# Patient Record
Sex: FEMALE | Race: BLACK OR AFRICAN AMERICAN | Employment: PART TIME | URBAN - METROPOLITAN AREA
[De-identification: names, ages, dates, MRNs, and addresses within clinical notes are randomized per-mention and may not be internally consistent; named-entity substitution may affect disease eponyms.]

---

## 2021-12-22 ENCOUNTER — VIRTUAL VISIT (OUTPATIENT)
Dept: FAMILY MEDICINE CLINIC | Age: 31
End: 2021-12-22
Payer: MEDICAID

## 2021-12-22 DIAGNOSIS — Z00.00 ENCOUNTER FOR WELLNESS EXAMINATION IN ADULT: ICD-10-CM

## 2021-12-22 DIAGNOSIS — F41.9 ANXIETY AND DEPRESSION: Primary | ICD-10-CM

## 2021-12-22 DIAGNOSIS — R63.0 DECREASE IN APPETITE: ICD-10-CM

## 2021-12-22 DIAGNOSIS — F32.A ANXIETY AND DEPRESSION: Primary | ICD-10-CM

## 2021-12-22 DIAGNOSIS — F51.01 PRIMARY INSOMNIA: ICD-10-CM

## 2021-12-22 PROCEDURE — 99203 OFFICE O/P NEW LOW 30 MIN: CPT | Performed by: NURSE PRACTITIONER

## 2021-12-22 PROCEDURE — 99385 PREV VISIT NEW AGE 18-39: CPT | Performed by: NURSE PRACTITIONER

## 2021-12-22 RX ORDER — CHOLECALCIFEROL (VITAMIN D3) 125 MCG
CAPSULE ORAL
COMMUNITY
End: 2022-03-28 | Stop reason: ALTCHOICE

## 2021-12-22 RX ORDER — UREA 10 %
100 LOTION (ML) TOPICAL DAILY
COMMUNITY
End: 2022-03-28 | Stop reason: ALTCHOICE

## 2021-12-22 RX ORDER — SERTRALINE HYDROCHLORIDE 25 MG/1
25 TABLET, FILM COATED ORAL DAILY
Qty: 90 TABLET | Refills: 1 | Status: SHIPPED | OUTPATIENT
Start: 2021-12-22 | End: 2022-01-05 | Stop reason: SDUPTHER

## 2021-12-22 RX ORDER — ESZOPICLONE 2 MG/1
2 TABLET, FILM COATED ORAL
Qty: 90 TABLET | Refills: 0 | Status: SHIPPED | OUTPATIENT
Start: 2021-12-22 | End: 2022-01-21 | Stop reason: ALTCHOICE

## 2021-12-22 NOTE — PROGRESS NOTES
1. Have you been to the ER, urgent care clinic since your last visit? Hospitalized since your last visit? No    2. Have you seen or consulted any other health care providers outside of the [address] [place] [address] since your last visit? Include any pap smears or colon screening. 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 Pt sees a therapist but pt stated she had a mental breakdown last month and she needs something to help. possibly zoloft? .  POt is ashamed of what she is going through she does not feel in control    Depression   '  3 most recent PHQ Screens 12/22/2021   Little interest or pleasure in doing things Nearly every day   Feeling down, depressed, irritable, or hopeless Nearly every day   Total Score PHQ 2 6   Trouble falling or staying asleep, or sleeping too much More than half the days   Feeling tired or having little energy Nearly every day   Poor appetite, weight loss, or overeating Nearly every day   Feeling bad about yourself - or that you are a failure or have let yourself or your family down Nearly every day   Trouble concentrating on things such as school, work, reading, or watching TV Nearly every day   Moving or speaking so slowly that other people could have noticed; or the opposite being so fidgety that others notice Nearly every day   Thoughts of being better off dead, or hurting yourself in some way More than half the days   PHQ 9 Score 25   How difficult have these problems made it for you to do your work, take care of your home and get along with others Very difficult     ROSALVA 2/7 12/22/2021   Feeling nervous, anxious, or on edge 3   Not being able to stop or control worrying 3   Worrying too much about different things 3   Trouble relaxing 3   Being so restless that it is hard to sit still 3   Becoming easily annoyed or irritable 3   Feeling afraid as if something awful might happen 3   ROSALVA-7 Total Score 21
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
## 2022-01-05 NOTE — TELEPHONE ENCOUNTER
Pt called in she said she was only able to get a 30 day supply of her Zoloft , and she said she found that 50mg each night works best for her , as of right now she only has 3 days left of the medication , she said she wont have insurance for another 3 months but she said if she can get refills she will figure out the cost for right now as she does not want to stop medication.  I recommended she check on [facility] for discount cards to help with cost .

## 2022-01-06 RX ORDER — SERTRALINE HYDROCHLORIDE 25 MG/1
25 TABLET, FILM COATED ORAL DAILY
Qty: 90 TABLET | Refills: 1 | Status: SHIPPED | OUTPATIENT
Start: 2022-01-06 | End: 2022-01-07 | Stop reason: SDUPTHER

## 2022-01-07 RX ORDER — SERTRALINE HYDROCHLORIDE 25 MG/1
25 TABLET, FILM COATED ORAL DAILY
Qty: 30 TABLET | Refills: 2 | Status: SHIPPED | OUTPATIENT
Start: 2022-01-07 | End: 2022-01-21 | Stop reason: ALTCHOICE

## 2022-01-07 NOTE — TELEPHONE ENCOUNTER
Patient called stating that [facility] will not fill for 90-day supply. Please review and adjust.    Please call patient once completed.

## 2022-01-21 ENCOUNTER — OFFICE VISIT (OUTPATIENT)
Dept: FAMILY MEDICINE CLINIC | Age: 32
End: 2022-01-21
Payer: COMMERCIAL

## 2022-01-21 DIAGNOSIS — F32.A ANXIETY AND DEPRESSION: ICD-10-CM

## 2022-01-21 DIAGNOSIS — F51.01 PRIMARY INSOMNIA: ICD-10-CM

## 2022-01-21 DIAGNOSIS — E66.3 OVERWEIGHT: Primary | ICD-10-CM

## 2022-01-21 DIAGNOSIS — Z11.59 ENCOUNTER FOR HEPATITIS C SCREENING TEST FOR LOW RISK PATIENT: ICD-10-CM

## 2022-01-21 DIAGNOSIS — Z13.0 SCREENING FOR DEFICIENCY ANEMIA: ICD-10-CM

## 2022-01-21 DIAGNOSIS — R63.0 DECREASE IN APPETITE: ICD-10-CM

## 2022-01-21 DIAGNOSIS — Z13.220 SCREENING CHOLESTEROL LEVEL: ICD-10-CM

## 2022-01-21 DIAGNOSIS — F41.9 ANXIETY AND DEPRESSION: ICD-10-CM

## 2022-01-21 DIAGNOSIS — E55.9 VITAMIN D DEFICIENCY: ICD-10-CM

## 2022-01-21 DIAGNOSIS — E53.8 VITAMIN B12 DEFICIENCY: ICD-10-CM

## 2022-01-21 PROBLEM — Z00.00 ENCOUNTER FOR WELLNESS EXAMINATION IN ADULT: Status: RESOLVED | Noted: 2021-12-22 | Resolved: 2022-01-21

## 2022-01-21 PROCEDURE — 99214 OFFICE O/P EST MOD 30 MIN: CPT | Performed by: NURSE PRACTITIONER

## 2022-01-21 RX ORDER — SERTRALINE HYDROCHLORIDE 100 MG/1
100 TABLET, FILM COATED ORAL DAILY
Qty: 90 TABLET | Refills: 1 | Status: SHIPPED | OUTPATIENT
Start: 2022-01-21 | End: 2022-07-22

## 2022-01-21 RX ORDER — ESZOPICLONE 2 MG/1
TABLET, FILM COATED ORAL
Qty: 180 TABLET | Refills: 1 | Status: SHIPPED | OUTPATIENT
Start: 2022-01-21 | End: 2022-08-03 | Stop reason: ALTCHOICE

## 2022-01-21 NOTE — PROGRESS NOTES
Russel Miller (: 1990) is a 33 y.o. female, established patient, here for evaluation of the following chief complaint(s):   Follow Up Chronic Condition       ASSESSMENT/PLAN:  Below is the assessment and plan developed based on review of pertinent history, labs, studies, and medications. 1. Overweight  -     CBC WITH AUTOMATED DIFF; Future  -     METABOLIC PANEL, COMPREHENSIVE; Future  Obtaining updated CBC and CMP for trending and will make treatment decisions when I get the results  2. Screening cholesterol level  -     LIPID PANEL; Future  Obtaining updated lipid panel for trending and will make treatment decisions when I get the results  3. Screening for deficiency anemia  Obtaining updated CBC for trending and will make supplementation decisions when I get the results  4. Vitamin D deficiency  -     VITAMIN D, 25 HYDROXY; Future  Obtaining updated Vitamin D for trending and will make treatment decisions when I get the results  5. Vitamin B12 deficiency  -     VITAMIN B12; Future  Obtaining updated B12 for trending and will make supplementation decisions when I get the results  6. Encounter for hepatitis C screening test for low risk patient  -     HEPATITIS C AB; Future  Screening for hep C and will confirm positive with additional lab testing and will also refer to GI for further evaluation and treatment  7. Primary insomnia  -     eszopiclone (LUNESTA) 2 mg tablet; Take 2 tablets at bedtime by mouth, Normal, Disp-180 Tablet, R-1  Patient states that this med works very well for her insomnia. Will continue with this regimen at its present dosage  8. Anxiety and depression  9. Decrease in appetite  Patient has noted and increase in her appetite with the treatment of her anxiety and depression. Will continue with her zoloft and will monitor appetite with subsequent office visits      Return in about 6 months (around 2022) for f/u of anxiety and depression.     SUBJECTIVE/OBJECTIVE:  34 yo female presents after being put on zoloft for depression and anxiety. Patient is very pleased with the use of this medication and wishes that she had done it sooner. It has helped with her self confidence and in other ways that she could not have possibly imagined. She has also agreed to come in for fasting labs. She is also finding that lunesta has improved her ability to sleep. She is in need of refills of the lunesta and the zoloft      Review of Systems   ROS per HPI and PMH  No data recorded     Physical Exam  HENT:      Head: Normocephalic. Neurological:      Mental Status: She is alert and oriented to person, place, and time. Psychiatric:         Mood and Affect: Mood normal.         Behavior: Behavior normal.         Thought Content: Thought content normal.         Judgment: Judgment normal.                 Serg Burgos, was evaluated through a synchronous (real-time) audio-video encounter. The patient (or guardian if applicable) is aware that this is a billable service, which includes applicable co-pays. Verbal consent to proceed has been obtained. The visit was conducted pursuant to the emergency declaration under the [facility], [address] [place] [address] [facility] Health authority and the [employer] General Act. Patient identification was verified, and a caregiver was present when appropriate. The patient was located at home in a state where the provider was licensed to provide care. An electronic signature was used to authenticate this note.   -- Lisa Arellano NP

## 2022-01-21 NOTE — PROGRESS NOTES
Chief Complaint   Patient presents with    Follow Up Chronic Condition   1. Have you been to the ER, urgent care clinic since your last visit? Hospitalized since your last visit? No    2. Have you seen or consulted any other health care providers outside of the [address] [place] [address] since your last visit? Include any pap smears or colon screening.  No       Patient would like to use # [phone] for her visit

## 2022-01-23 PROBLEM — E55.9 VITAMIN D DEFICIENCY: Status: ACTIVE | Noted: 2022-01-23

## 2022-01-23 PROBLEM — E53.8 VITAMIN B12 DEFICIENCY: Status: ACTIVE | Noted: 2022-01-23

## 2022-01-23 PROBLEM — Z11.59 ENCOUNTER FOR HEPATITIS C SCREENING TEST FOR LOW RISK PATIENT: Status: ACTIVE | Noted: 2021-12-22

## 2022-01-23 PROBLEM — Z13.0 SCREENING FOR DEFICIENCY ANEMIA: Status: ACTIVE | Noted: 2021-12-22

## 2022-01-23 PROBLEM — Z13.220 SCREENING CHOLESTEROL LEVEL: Status: ACTIVE | Noted: 2021-12-22

## 2022-03-02 ENCOUNTER — TELEPHONE (OUTPATIENT)
Dept: FAMILY MEDICINE CLINIC | Age: 32
End: 2022-03-02

## 2022-03-02 NOTE — TELEPHONE ENCOUNTER
You can schedule for a same day tomorrow or see if [facility] has any available appts next week

## 2022-03-02 NOTE — TELEPHONE ENCOUNTER
----- Message from Vickyia Nehemiah sent at 3/2/2022  8:25 AM EST -----  Subject: Appointment Request    Reason for Call: Routine (Patient Request) No Script    QUESTIONS  Type of Appointment? Established Patient  Reason for appointment request? Available appointments did not meet   patient need  Additional Information for Provider? Patient needs to be seen for nodule   in middle of breasts. Would like to be seen in person. Please call patient   back to get appt. Only virtual appts are showing. [id number]  ---------------------------------------------------------------------------  --------------  Angus KING  What is the best way for the office to contact you? OK to leave message on   voicemail  Preferred Call Back Phone Number? [phone]  ---------------------------------------------------------------------------  --------------  SCRIPT ANSWERS  Relationship to Patient? Self  (Is the patient requesting to see the provider for a procedure?)? No  (Is the patient requesting to see the provider urgently  today or   tomorrow. )? No  Have you been diagnosed with, awaiting test results for, or told that you   are suspected of having COVID-19 (Coronavirus)? (If patient has tested   negative or was tested as a requirement for work, school, or travel and   not based on symptoms, answer no)? No  Within the past 10 days have you developed any of the following symptoms   (answer no if symptoms have been present longer than 10 days or began   more than 10 days ago)? Fever or Chills, Cough, Shortness of breath or   difficulty breathing, Loss of taste or smell, Sore throat, Nasal   congestion, Sneezing or runny nose, Fatigue or generalized body aches   (answer no if pain is specific to a body part e.g. back pain), Diarrhea,   Headache? No  Have you had close contact with someone with COVID-19 in the last 7 days? No  (Service Expert  click yes below to proceed with [facility] As Usual   Scheduling)?  Yes

## 2022-03-07 ENCOUNTER — OFFICE VISIT (OUTPATIENT)
Dept: FAMILY MEDICINE CLINIC | Age: 32
End: 2022-03-07
Payer: COMMERCIAL

## 2022-03-07 VITALS
TEMPERATURE: 97.2 F | WEIGHT: 170 LBS | BODY MASS INDEX: 31.28 KG/M2 | HEIGHT: 62 IN | OXYGEN SATURATION: 99 % | RESPIRATION RATE: 16 BRPM | SYSTOLIC BLOOD PRESSURE: 138 MMHG | HEART RATE: 92 BPM | DIASTOLIC BLOOD PRESSURE: 88 MMHG

## 2022-03-07 DIAGNOSIS — Z13.0 SCREENING FOR DEFICIENCY ANEMIA: ICD-10-CM

## 2022-03-07 DIAGNOSIS — F41.9 ANXIETY AND DEPRESSION: ICD-10-CM

## 2022-03-07 DIAGNOSIS — K44.9 HIATAL HERNIA: Primary | ICD-10-CM

## 2022-03-07 DIAGNOSIS — Z13.220 SCREENING CHOLESTEROL LEVEL: ICD-10-CM

## 2022-03-07 DIAGNOSIS — E66.3 OVERWEIGHT: ICD-10-CM

## 2022-03-07 DIAGNOSIS — E55.9 VITAMIN D DEFICIENCY: ICD-10-CM

## 2022-03-07 DIAGNOSIS — F51.01 PRIMARY INSOMNIA: ICD-10-CM

## 2022-03-07 DIAGNOSIS — Z11.59 ENCOUNTER FOR HEPATITIS C SCREENING TEST FOR LOW RISK PATIENT: ICD-10-CM

## 2022-03-07 DIAGNOSIS — E53.8 VITAMIN B12 DEFICIENCY: ICD-10-CM

## 2022-03-07 DIAGNOSIS — F32.A ANXIETY AND DEPRESSION: ICD-10-CM

## 2022-03-07 PROCEDURE — 99214 OFFICE O/P EST MOD 30 MIN: CPT | Performed by: NURSE PRACTITIONER

## 2022-03-07 NOTE — PROGRESS NOTES
Chief Complaint   Patient presents with    Other     Bulding btwn chest, it was sore, mild discoloration      1. Have you been to the ER, urgent care clinic since your last visit? Hospitalized since your last visit? No    2. Have you seen or consulted any other health care providers outside of the [address] [place] [address] since your last visit? Include any pap smears or colon screening.  No   Visit Vitals  BP (!) 140/90 (BP 1 Location: Left upper arm, BP Patient Position: Sitting, BP Cuff Size: Adult)   Pulse 92   Temp 97.2 °F (36.2 °C) (Temporal)   Resp 16   Ht 5' 2\" (1.575 m)   Wt 170 lb (77.1 kg)   LMP 02/24/2022   SpO2 99%   BMI 31.09 kg/m²

## 2022-03-07 NOTE — PROGRESS NOTES
Subjective  Chief Complaint   Patient presents with    Other     Bulding btwn chest, it was sore, mild discoloration      HPI:  Maria L Abdullahi is a 33 y.o. female. 34 yo female presents with a complaint of an out pouching between her breast that is also hyperpigmented for the last 2 months. It was sore but now is not. She has noticed anything that had made it better or worse. Patient also did not get in to get her labs drawn so she will be doing so today. I also am going to have my CMA follow up on her referral to [address] [place] [address]. 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    Past Medical History:   Diagnosis Date    Anxiety     Depression      Family History   Problem Relation Age of Onset    Other Mother         pancreticitis    Hypertension Father      Social History     Socioeconomic History    Marital status: SINGLE     Spouse name: Not on file    Number of children: Not on file    Years of education: Not on file    Highest education level: Not on file   Occupational History    Not on file   Tobacco Use    Smoking status: Never Smoker    Smokeless tobacco: Never Used   Vaping Use    Vaping Use: Never used   Substance and Sexual Activity    Alcohol use: Never    Drug use: Never    Sexual activity: Not Currently     Birth control/protection: None   Other Topics Concern    Not on file   Social History Narrative    Not on file     Social Determinants of Health     Financial Resource Strain:     Difficulty of Paying Living Expenses: Not on file   Food Insecurity:     Worried About [address] in the Last Year: Not on file    [address] in the Last Year: Not on file   Transportation Needs:     Lack of Transportation (Medical): Not on file    Lack of Transportation (Non-Medical):  Not on file   Physical Activity:     Days of Exercise per Week: Not on file    Minutes of Exercise per Session: Not on file   Stress:     Feeling of Stress : Not on file   Social Connections:     Frequency of Communication with Friends and Family: Not on file    Frequency of Social Gatherings with Friends and Family: Not on file    Attends Voodoo Services: Not on file    Active Member of [address] [place] [address] [employer] or Organizations: Not on file    Attends Club or Organization Meetings: Not on file    Marital Status: Not on file   Intimate Partner Violence:     Fear of Current or Ex-Partner: Not on file    Emotionally Abused: Not on file    Physically Abused: Not on file    Sexually Abused: Not on file   Housing Stability:     Unable to Pay for Housing in the Last Year: Not on file    Number of [place] in the Last Year: Not on file    Unstable Housing in the Last Year: Not on file     Current Outpatient Medications on File Prior to Visit   Medication Sig Dispense Refill    sertraline (ZOLOFT) 100 mg tablet Take 1 Tablet by mouth daily. 90 Tablet 1    eszopiclone (LUNESTA) 2 mg tablet Take 2 tablets at bedtime by mouth 180 Tablet 1    cholecalciferol, vitamin D3, (Vitamin D3) 50 mcg (2,000 unit) tab Take  by mouth. (Patient not taking: Reported on 3/7/2022)      cyanocobalamin (Vitamin B-12) 100 mcg tablet Take 100 mcg by mouth daily. (Patient not taking: Reported on 3/7/2022)       No current facility-administered medications on file prior to visit. No Known Allergies  ROS   ROS per HPI and PMH      Objective  Physical Exam  Vitals reviewed. HENT:      Head: Normocephalic. Cardiovascular:      Rate and Rhythm: Normal rate and regular rhythm. Heart sounds: Normal heart sounds. Pulmonary:      Effort: Pulmonary effort is normal.      Breath sounds: Normal breath sounds. Abdominal:      General: Bowel sounds are normal.      Palpations: Abdomen is soft. Skin:     Comments: Soft reducible mass mid breasts.   The skin is also hyperpigmented   Neurological:      Mental Status: She is alert and oriented to person, place, and time. Psychiatric:         Mood and Affect: Mood normal.         Behavior: Behavior normal.         Thought Content: Thought content normal.         Judgment: Judgment normal.          Assessment & Plan      ICD-10-CM ICD-9-CM    1. Hiatal hernia  K44.9 553.3 US CHEST      US CHEST   2. Vitamin D deficiency  E55.9 268.9 VITAMIN D, 25 HYDROXY   3. Vitamin B12 deficiency  E53.8 266.2 VITAMIN B12   4. Overweight  E66.3 278.02 CBC WITH AUTOMATED DIFF      METABOLIC PANEL, COMPREHENSIVE   5. Screening cholesterol level  Z13.220 V77.91 LIPID PANEL   6. Screening for deficiency anemia  Z13.0 V78.1 CBC WITH AUTOMATED DIFF   7. Encounter for hepatitis C screening test for low risk patient  Z11.59 V73.89 HEPATITIS C AB   8. Anxiety and depression  F41.9 300.00     F32. A 311    9. Primary insomnia  F51.01 307.42      Diagnoses and all orders for this visit:    1. Hiatal hernia  -     US CHEST; Future  Obtaining US of chest for additional clinical information  2. Vitamin D deficiency  -     VITAMIN D, 25 HYDROXY  Obtaining updated  for trending and will make supplementation decisions when I get the results      3. Vitamin B12 deficiency  -     VITAMIN B12  Obtaining updated B12 for trending and will make supplementation decisions when I get the results    4. Overweight  -     CBC WITH AUTOMATED DIFF  -     METABOLIC PANEL, COMPREHENSIVE  Obtaining updated CBC and CMP for trending and will make supplementation decisions when I get the results      5. Screening cholesterol level  -     LIPID PANEL  Obtaining updated Lipid panel for trending and will make supplementation decisions when I get the results      6. Screening for deficiency anemia  -     CBC WITH AUTOMATED DIFF  Obtaining updated CBC for trending and will make supplementation decisions when I get the results      7. Encounter for hepatitis C screening test for low risk patient  -     HEPATITIS C AB  Screening for hep C and will confirm positive     8. Anxiety and depression  Patient states that she feels that the sertraline is working but does admit to having a \"rough couple of days\". She will let me know if it continues. 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 Primary insomnia  Patient states that the Gretchen Lola is working for her. Will continue patient on this medication at this dosage      Follow-up and Dispositions    · Return in about 6 months (around 9/7/2022) for f/u of chronic conditions with fasting labs.        Ifrah Cruz, TOLU

## 2022-03-08 LAB
25(OH)D3+25(OH)D2 SERPL-MCNC: 41.7 NG/ML (ref 30–100)
ALBUMIN SERPL-MCNC: 4.9 G/DL (ref 3.8–4.8)
ALBUMIN/GLOB SERPL: 1.8 {RATIO} (ref 1.2–2.2)
ALP SERPL-CCNC: 77 IU/L (ref 44–121)
ALT SERPL-CCNC: 18 IU/L (ref 0–32)
AST SERPL-CCNC: 22 IU/L (ref 0–40)
BASOPHILS # BLD AUTO: 0 X10E3/UL (ref 0–0.2)
BASOPHILS NFR BLD AUTO: 0 %
BILIRUB SERPL-MCNC: 0.2 MG/DL (ref 0–1.2)
BUN SERPL-MCNC: 11 MG/DL (ref 6–20)
BUN/CREAT SERPL: 12 (ref 9–23)
CALCIUM SERPL-MCNC: 9.6 MG/DL (ref 8.7–10.2)
CHLORIDE SERPL-SCNC: 99 MMOL/L (ref 96–106)
CHOLEST SERPL-MCNC: 178 MG/DL (ref 100–199)
CO2 SERPL-SCNC: 23 MMOL/L (ref 20–29)
CREAT SERPL-MCNC: 0.89 MG/DL (ref 0.57–1)
EGFR: 88 ML/MIN/1.73
EOSINOPHIL # BLD AUTO: 0 X10E3/UL (ref 0–0.4)
EOSINOPHIL NFR BLD AUTO: 1 %
ERYTHROCYTE [DISTWIDTH] IN BLOOD BY AUTOMATED COUNT: 14.6 % (ref 11.7–15.4)
GLOBULIN SER CALC-MCNC: 2.8 G/DL (ref 1.5–4.5)
GLUCOSE SERPL-MCNC: 82 MG/DL (ref 65–99)
HCT VFR BLD AUTO: 36.2 % (ref 34–46.6)
HCV AB S/CO SERPL IA: <0.1 S/CO RATIO (ref 0–0.9)
HDLC SERPL-MCNC: 66 MG/DL
HGB BLD-MCNC: 11.4 G/DL (ref 11.1–15.9)
IMM GRANULOCYTES # BLD AUTO: 0 X10E3/UL (ref 0–0.1)
IMM GRANULOCYTES NFR BLD AUTO: 0 %
LDLC SERPL CALC-MCNC: 98 MG/DL (ref 0–99)
LYMPHOCYTES # BLD AUTO: 1.7 X10E3/UL (ref 0.7–3.1)
LYMPHOCYTES NFR BLD AUTO: 35 %
MCH RBC QN AUTO: 25.7 PG (ref 26.6–33)
MCHC RBC AUTO-ENTMCNC: 31.5 G/DL (ref 31.5–35.7)
MCV RBC AUTO: 82 FL (ref 79–97)
MONOCYTES # BLD AUTO: 0.5 X10E3/UL (ref 0.1–0.9)
MONOCYTES NFR BLD AUTO: 11 %
NEUTROPHILS # BLD AUTO: 2.6 X10E3/UL (ref 1.4–7)
NEUTROPHILS NFR BLD AUTO: 53 %
PLATELET # BLD AUTO: 268 X10E3/UL (ref 150–450)
POTASSIUM SERPL-SCNC: 4.4 MMOL/L (ref 3.5–5.2)
PROT SERPL-MCNC: 7.7 G/DL (ref 6–8.5)
RBC # BLD AUTO: 4.44 X10E6/UL (ref 3.77–5.28)
SODIUM SERPL-SCNC: 136 MMOL/L (ref 134–144)
TRIGL SERPL-MCNC: 77 MG/DL (ref 0–149)
VIT B12 SERPL-MCNC: 790 PG/ML (ref 232–1245)
VLDLC SERPL CALC-MCNC: 14 MG/DL (ref 5–40)
WBC # BLD AUTO: 5 X10E3/UL (ref 3.4–10.8)

## 2022-03-09 ENCOUNTER — TELEPHONE (OUTPATIENT)
Dept: FAMILY MEDICINE CLINIC | Age: 32
End: 2022-03-09

## 2022-03-09 RX ORDER — AMLODIPINE BESYLATE 10 MG/1
10 TABLET ORAL DAILY
Qty: 90 TABLET | Refills: 1 | Status: SHIPPED | OUTPATIENT
Start: 2022-03-09 | End: 2022-09-29

## 2022-03-09 NOTE — TELEPHONE ENCOUNTER
Amlodipine ordered for blood pressure and patient will be instructed by nurse to make a f/u appointment to further assess (can you make the appointment, Nehemias?

## 2022-03-09 NOTE — TELEPHONE ENCOUNTER
----- Message from Chaparro Asher sent at 3/9/2022 12:03 PM EST -----  Subject: Message to Provider    QUESTIONS  Information for Provider? For Chris's Nurse 140/86 // 142/96 // 164/108   elevated BP since Monday and headaches// please call (patient did not wish   to be triaged)  ---------------------------------------------------------------------------  --------------  [address]  What is the best way for the office to contact you? OK to leave message on   voicemail  Preferred Call Back Phone Number? [phone]  ---------------------------------------------------------------------------  --------------  SCRIPT ANSWERS  Relationship to Patient?  Self

## 2022-03-10 ENCOUNTER — OFFICE VISIT (OUTPATIENT)
Dept: FAMILY MEDICINE CLINIC | Age: 32
End: 2022-03-10
Payer: COMMERCIAL

## 2022-03-10 ENCOUNTER — TELEPHONE (OUTPATIENT)
Dept: FAMILY MEDICINE CLINIC | Age: 32
End: 2022-03-10

## 2022-03-10 VITALS
BODY MASS INDEX: 31.65 KG/M2 | WEIGHT: 172 LBS | TEMPERATURE: 97.2 F | OXYGEN SATURATION: 100 % | RESPIRATION RATE: 16 BRPM | HEART RATE: 103 BPM | HEIGHT: 62 IN | SYSTOLIC BLOOD PRESSURE: 150 MMHG | DIASTOLIC BLOOD PRESSURE: 98 MMHG

## 2022-03-10 DIAGNOSIS — F32.A ANXIETY AND DEPRESSION: ICD-10-CM

## 2022-03-10 DIAGNOSIS — Z00.00 ENCOUNTER FOR WELLNESS EXAMINATION IN ADULT: ICD-10-CM

## 2022-03-10 DIAGNOSIS — F51.01 PRIMARY INSOMNIA: ICD-10-CM

## 2022-03-10 DIAGNOSIS — R07.89 CHEST DISCOMFORT: Primary | ICD-10-CM

## 2022-03-10 DIAGNOSIS — K44.9 HIATAL HERNIA: ICD-10-CM

## 2022-03-10 DIAGNOSIS — E53.8 VITAMIN B12 DEFICIENCY: ICD-10-CM

## 2022-03-10 DIAGNOSIS — Z13.220 SCREENING CHOLESTEROL LEVEL: ICD-10-CM

## 2022-03-10 DIAGNOSIS — E55.9 VITAMIN D DEFICIENCY: ICD-10-CM

## 2022-03-10 DIAGNOSIS — F41.9 ANXIETY AND DEPRESSION: ICD-10-CM

## 2022-03-10 PROCEDURE — 99395 PREV VISIT EST AGE 18-39: CPT | Performed by: NURSE PRACTITIONER

## 2022-03-10 PROCEDURE — 99213 OFFICE O/P EST LOW 20 MIN: CPT | Performed by: NURSE PRACTITIONER

## 2022-03-10 RX ORDER — HYDROCHLOROTHIAZIDE 25 MG/1
25 TABLET ORAL DAILY
Qty: 90 TABLET | Refills: 1 | Status: SHIPPED | OUTPATIENT
Start: 2022-03-10 | End: 2022-09-29

## 2022-03-10 NOTE — TELEPHONE ENCOUNTER
----- Message from Alicja Keith sent at 3/10/2022  7:10 AM EST -----  Subject: Appointment Request    Reason for Call: Urgent (Patient Request) ED Follow Up Visit    QUESTIONS  Type of Appointment? Established Patient  Reason for appointment request? Available appointments did not meet   patient need  Additional Information for Provider? Pt needs a callback to schedule appt   sooner than 3/21.   ---------------------------------------------------------------------------  --------------  CALL BACK INFO  What is the best way for the office to contact you? OK to leave message on   voicemail  Preferred Call Back Phone Number? [phone]  ---------------------------------------------------------------------------  --------------  SCRIPT ANSWERS  Relationship to Patient? Self  (Patient requests to see provider urgently. )? Yes  Have you been diagnosed with, awaiting test results for, or told that you   are suspected of having COVID-19 (Coronavirus)? (If patient has tested   negative or was tested as a requirement for work, school, or travel and   not based on symptoms, answer no)? No  Within the past 10 days have you developed any of the following symptoms   (answer no if symptoms have been present longer than 10 days or began   more than 10 days ago)? Fever or Chills, Cough, Shortness of breath or   difficulty breathing, Loss of taste or smell, Sore throat, Nasal   congestion, Sneezing or runny nose, Fatigue or generalized body aches   (answer no if pain is specific to a body part e.g. back pain), Diarrhea,   Headache? No  Have you had close contact with someone with COVID-19 in the last 7 days? No  (Service Expert  click yes below to proceed with [employer] As Usual   Scheduling)?  Yes

## 2022-03-10 NOTE — PROGRESS NOTES
Chief Complaint   Patient presents with    Hypertension     1. Have you been to the ER, urgent care clinic since your last visit? Hospitalized since your last visit? [facility]   2. Have you seen or consulted any other health care providers outside of the [address] [place] [address] since your last visit? Include any pap smears or colon screening.  No   Visit Vitals  BP (!) 150/98 (BP 1 Location: Left upper arm, BP Patient Position: Sitting, BP Cuff Size: Adult)   Pulse (!) 103   Temp 97.2 °F (36.2 °C) (Temporal)   Resp 16   Ht 5' 2\" (1.575 m)   Wt 172 lb (78 kg)   LMP 02/24/2022   SpO2 100%   BMI 31.46 kg/m²

## 2022-03-10 NOTE — TELEPHONE ENCOUNTER
Caller stated that they will not see a hiatal hernia with an ultrasound and would like to know what to do. She also stated that if the appointment needs to be canceled, possibly calling the patient.

## 2022-03-14 NOTE — PROGRESS NOTES
Subjective  Chief Complaint   Patient presents with    Hypertension     HPI:  Ijeoma Grey is a 33 y.o. female. 32 yo female presents for wellness with fasting labs and physical.  He health screenings are as documented in the EMR. She will be scheduling her pap with her regular provider. She is also c/o chest discomfort that she feels is a bad case of GERDs and would like to be checked out by GI secondary to her history of a hiatal hernia. She is also in need of refills for her meds    Past Medical History:   Diagnosis Date    Anxiety     Depression      Family History   Problem Relation Age of Onset    Other Mother         pancreticitis    Hypertension Father      Social History     Socioeconomic History    Marital status: SINGLE     Spouse name: Not on file    Number of children: Not on file    Years of education: Not on file    Highest education level: Not on file   Occupational History    Not on file   Tobacco Use    Smoking status: Never Smoker    Smokeless tobacco: Never Used   Vaping Use    Vaping Use: Never used   Substance and Sexual Activity    Alcohol use: Never    Drug use: Never    Sexual activity: Not Currently     Birth control/protection: None   Other Topics Concern    Not on file   Social History Narrative    Not on file     Social Determinants of Health     Financial Resource Strain:     Difficulty of Paying Living Expenses: Not on file   Food Insecurity:     Worried About [address] in the Last Year: Not on file    [address] in the Last Year: Not on file   Transportation Needs:     Lack of Transportation (Medical): Not on file    Lack of Transportation (Non-Medical):  Not on file   Physical Activity:     Days of Exercise per Week: Not on file    Minutes of Exercise per Session: Not on file   Stress:     Feeling of Stress : Not on file   Social Connections:     Frequency of Communication with Friends and Family: Not on file    Frequency of Social Gatherings with Friends and Family: Not on file    Attends Hinduism Services: Not on file    Active Member of Clubs or Organizations: Not on file    Attends Club or Organization Meetings: Not on file    Marital Status: Not on file   Intimate Partner Violence:     Fear of Current or Ex-Partner: Not on file    Emotionally Abused: Not on file    Physically Abused: Not on file    Sexually Abused: Not on file   Housing Stability:     Unable to Pay for Housing in the Last Year: Not on file    Number of [place] in the Last Year: Not on file    Unstable Housing in the Last Year: Not on file     Current Outpatient Medications on File Prior to Visit   Medication Sig Dispense Refill    amLODIPine (NORVASC) 10 mg tablet Take 1 Tablet by mouth daily. 90 Tablet 1    sertraline (ZOLOFT) 100 mg tablet Take 1 Tablet by mouth daily. 90 Tablet 1    eszopiclone (LUNESTA) 2 mg tablet Take 2 tablets at bedtime by mouth 180 Tablet 1    cholecalciferol, vitamin D3, (Vitamin D3) 50 mcg (2,000 unit) tab Take  by mouth. (Patient not taking: Reported on 3/7/2022)      cyanocobalamin (Vitamin B-12) 100 mcg tablet Take 100 mcg by mouth daily. (Patient not taking: Reported on 3/7/2022)       No current facility-administered medications on file prior to visit. No Known Allergies  ROS   ROS per HPI and PMH      Objective  Physical Exam  Vitals reviewed. HENT:      Head: Normocephalic. Cardiovascular:      Rate and Rhythm: Normal rate and regular rhythm. Heart sounds: Normal heart sounds. Pulmonary:      Effort: Pulmonary effort is normal.      Breath sounds: Normal breath sounds. Abdominal:      General: Bowel sounds are normal.      Palpations: Abdomen is soft. Skin:     General: Skin is warm and dry. Neurological:      Mental Status: She is alert and oriented to person, place, and time.    Psychiatric:         Mood and Affect: Mood normal.         Behavior: Behavior normal.         Thought Content: Thought content normal.         Judgment: Judgment normal.          Assessment & Plan      ICD-10-CM ICD-9-CM    1. Chest discomfort  R07.89 786.59 REFERRAL TO GASTROENTEROLOGY      REFERRAL TO GASTROENTEROLOGY   2. Encounter for wellness examination in adult  Z00.00 V70.0    3. Anxiety and depression  F41.9 300.00     F32. A 311    4. Hiatal hernia  K44.9 553.3    5. Primary insomnia  F51.01 307.42    6. Screening cholesterol level  Z13.220 V77.91    7. Vitamin B12 deficiency  E53.8 266.2    8. Vitamin D deficiency  E55.9 268.9      Diagnoses and all orders for this visit:    1. Chest discomfort  -     REFERRAL TO GASTROENTEROLOGY; Future  Referral to GI to evaluate her hiatal hernia    2. Encounter for wellness examination in adult  We are making sure that her health screenings are done in a timely fashion. They are as documented in the EMR and HPI    3. Anxiety and depression  Patient feels that this condition is adequately treated with the sertraline. I will continue with this medication at this dosage    4. Hiatal hernia  I am referring her to GI for further evaluation and treatment    5. Primary insomnia  Patient feels that this condition is adequately treated with Lunesta. We will continue with this medication at its present dosage    6. Screening cholesterol level  Obtaining an updated lipid panel for trendind and will make treatment decisions when I get the results    7. Vitamin B12 deficiency  Obtaining an updated B12 for trending and will make treatment decisions when I get the results      8. Vitamin D deficiency  Obtaining an updated Vitamin D for trending and will make treatment decisions when I get the results        Other orders  -     hydroCHLOROthiazide (HYDRODIURIL) 25 mg tablet; Take 1 Tablet by mouth daily. Follow-up and Dispositions    · Return in about 2 weeks (around 3/24/2022) for f/u for new bp med.        Vincenzo Chu, TOLU

## 2022-03-15 ENCOUNTER — TELEPHONE (OUTPATIENT)
Dept: FAMILY MEDICINE CLINIC | Age: 32
End: 2022-03-15

## 2022-03-15 DIAGNOSIS — R07.89 CHEST DISCOMFORT: Primary | ICD-10-CM

## 2022-03-15 NOTE — TELEPHONE ENCOUNTER
Spoke to pt she stated that since she has started the medication her BP has been good. She stated that now she has noticed that her HR has been crazy it will run sometimes 134 or 120. She said that she can't get into the gastro provider until next month and wants to know if she should see cardiology because they may be able to see her sooner.   Referred to: Dr. Erika Martinez  [address] [place] [address]  [phone]

## 2022-03-17 ENCOUNTER — TELEPHONE (OUTPATIENT)
Dept: FAMILY MEDICINE CLINIC | Age: 32
End: 2022-03-17

## 2022-03-17 RX ORDER — AMOXICILLIN 500 MG/1
500 CAPSULE ORAL 2 TIMES DAILY
Qty: 10 CAPSULE | Refills: 0 | Status: SHIPPED | OUTPATIENT
Start: 2022-03-17 | End: 2022-03-22

## 2022-03-17 NOTE — TELEPHONE ENCOUNTER
Spoke to pt she stated that she received my message about the cardiology referral.  Then pt stated that she has been dealing with a sinus infection for a week and wanted to know if she could get something called into the pharmacy. Advised pt that I would ask and call her back to let her know if we could do that or not.

## 2022-03-17 NOTE — TELEPHONE ENCOUNTER
Pt advised. She stated that she is currently doing in externship at an ENT and he checked her ears and advised her that she needed to take some sudafed to see if that worked. Pt stated she is going to give that a try and if it does not work she will take the amoxicillin.

## 2022-03-18 PROBLEM — K44.9 HIATAL HERNIA: Status: ACTIVE | Noted: 2022-03-07

## 2022-03-18 PROBLEM — F51.01 PRIMARY INSOMNIA: Status: ACTIVE | Noted: 2021-12-22

## 2022-03-18 PROBLEM — Z13.220 SCREENING CHOLESTEROL LEVEL: Status: ACTIVE | Noted: 2021-12-22

## 2022-03-19 PROBLEM — F32.A ANXIETY AND DEPRESSION: Status: ACTIVE | Noted: 2021-12-22

## 2022-03-19 PROBLEM — E55.9 VITAMIN D DEFICIENCY: Status: ACTIVE | Noted: 2022-01-23

## 2022-03-19 PROBLEM — F41.9 ANXIETY AND DEPRESSION: Status: ACTIVE | Noted: 2021-12-22

## 2022-03-20 PROBLEM — E53.8 VITAMIN B12 DEFICIENCY: Status: ACTIVE | Noted: 2022-01-23

## 2022-03-20 PROBLEM — R63.0 DECREASE IN APPETITE: Status: ACTIVE | Noted: 2021-12-22

## 2022-03-28 ENCOUNTER — OFFICE VISIT (OUTPATIENT)
Dept: FAMILY MEDICINE CLINIC | Age: 32
End: 2022-03-28
Payer: COMMERCIAL

## 2022-03-28 VITALS
BODY MASS INDEX: 31.65 KG/M2 | TEMPERATURE: 97.2 F | WEIGHT: 172 LBS | SYSTOLIC BLOOD PRESSURE: 128 MMHG | HEIGHT: 62 IN | OXYGEN SATURATION: 100 % | DIASTOLIC BLOOD PRESSURE: 90 MMHG | RESPIRATION RATE: 16 BRPM | HEART RATE: 84 BPM

## 2022-03-28 DIAGNOSIS — F41.9 ANXIETY AND DEPRESSION: ICD-10-CM

## 2022-03-28 DIAGNOSIS — I10 HYPERTENSION, UNSPECIFIED TYPE: Primary | ICD-10-CM

## 2022-03-28 DIAGNOSIS — F51.01 PRIMARY INSOMNIA: ICD-10-CM

## 2022-03-28 DIAGNOSIS — F32.A ANXIETY AND DEPRESSION: ICD-10-CM

## 2022-03-28 PROCEDURE — 99214 OFFICE O/P EST MOD 30 MIN: CPT | Performed by: NURSE PRACTITIONER

## 2022-03-28 NOTE — PROGRESS NOTES
Subjective  Chief Complaint   Patient presents with    Hypertension     Was taking lunesta and stopped the day of last appt and thinks that was her issue     HPI:  Analisa Berman is a 33 y.o. female. 32 yo female presents for recheck of blood pressure. At her last office visit she decided that the bp problems were from her lunesta so she discontinued it. She also stopped taking her blood pressure medication and her pressures on presentation today is still elevated. Patient denies chest, jaw, shoulder, arm or chest pain and will seek emergent care with the presentation of any of these symptoms. Patient has concerns and would like to have a second child but is concerned in how that would be impacted by blood pressure. I told her many women with high blood pressure have successful pregnancies and she should start thinking about an OBGYN that specializes in high risk pregnancy. Her age is also a risk factor. 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 She has agreed to go back on her blood pressure medication and she can start taking her lunesta again if she desires. She will monitor her blood pressure daily and will call readings in to the office.   Reviewed labs of Dr. Keena Olivares    Past Medical History:   Diagnosis Date    Anxiety     Depression      Family History   Problem Relation Age of Onset    Other Mother         pancreticitis    Hypertension Father      Social History     Socioeconomic History    Marital status: SINGLE     Spouse name: Not on file    Number of children: Not on file    Years of education: Not on file    Highest education level: Not on file   Occupational History    Not on file   Tobacco Use    Smoking status: Never Smoker    Smokeless tobacco: Never Used   Vaping Use    Vaping Use: Never used   Substance and Sexual Activity    Alcohol use: Never    Drug use: Never    Sexual activity: Not Currently     Birth control/protection: None   Other Topics Concern    Not on file   Social History Narrative    Not on file     Social Determinants of Health     Financial Resource Strain:     Difficulty of Paying Living Expenses: Not on file   Food Insecurity:     Worried About Running Out of Food in the Last Year: Not on file    Johnathan of Food in the Last Year: Not on file   Transportation Needs:     Lack of Transportation (Medical): Not on file    Lack of Transportation (Non-Medical): Not on file   Physical Activity:     Days of Exercise per Week: Not on file    Minutes of Exercise per Session: Not on file   Stress:     Feeling of Stress : Not on file   Social Connections:     Frequency of Communication with Friends and Family: Not on file    Frequency of Social Gatherings with Friends and Family: Not on file    Attends Baptism Services: Not on file    Active Member of [address] [place] [address] [employer] or Organizations: Not on file    Attends Club or Organization Meetings: Not on file    Marital Status: Not on file   Intimate Partner Violence:     Fear of Current or Ex-Partner: Not on file    Emotionally Abused: Not on file    Physically Abused: Not on file    Sexually Abused: Not on file   Housing Stability:     Unable to Pay for Housing in the Last Year: Not on file    Number of [place] in the Last Year: Not on file    Unstable Housing in the Last Year: Not on file     Current Outpatient Medications on File Prior to Visit   Medication Sig Dispense Refill    sertraline (ZOLOFT) 100 mg tablet Take 1 Tablet by mouth daily. 90 Tablet 1    hydroCHLOROthiazide (HYDRODIURIL) 25 mg tablet Take 1 Tablet by mouth daily. (Patient not taking: Reported on 3/28/2022) 90 Tablet 1    amLODIPine (NORVASC) 10 mg tablet Take 1 Tablet by mouth daily.  (Patient not taking: Reported on 3/28/2022) 90 Tablet 1    eszopiclone (LUNESTA) 2 mg tablet Take 2 tablets at bedtime by mouth (Patient not taking: Reported on 3/28/2022) 180 Tablet 1    [DISCONTINUED] cholecalciferol, vitamin D3, (Vitamin D3) 50 mcg (2,000 unit) tab Take  by mouth. (Patient not taking: Reported on 3/7/2022)      [DISCONTINUED] cyanocobalamin (Vitamin B-12) 100 mcg tablet Take 100 mcg by mouth daily. (Patient not taking: Reported on 3/7/2022)       No current facility-administered medications on file prior to visit. No Known Allergies  ROS   ROS per HPI and PMH      Objective  Physical Exam  Neurological:      Mental Status: She is alert and oriented to person, place, and time. Psychiatric:         Mood and Affect: Mood normal.         Behavior: Behavior normal.         Thought Content: Thought content normal.         Judgment: Judgment normal.          Assessment & Plan      ICD-10-CM ICD-9-CM    1. Hypertension, unspecified type  I10 401.9    2. Anxiety and depression  F41.9 300.00     F32. A 311    3. Primary insomnia  F51.01 307.42      Diagnoses and all orders for this visit:    1. Hypertension, unspecified type  Patient has agreed to be adherent with her BP meds and will monitor daily and will call readings in to the office next Monday    2. Anxiety and depression  Patient feels that sertraline adequately treats her anxiety and depression. Will continue with the use of this med at its present dosage      3.  Primary insomnia  Patient had discontinued lunesta because she thought it was the cause of her HTN but after she stopped and her blood pressure was still elevated I think that we can eliminate it as the source of the HTN and I have left it up to Patient whether she wants to restart        Maya Barber NP

## 2022-03-28 NOTE — LETTER
3/28/2022    Ms. Maria L Nolen [id number]      Dear Maria L Abdullahi:    Please find your most recent results below. Resulted Orders   HEPATITIS C AB   Result Value Ref Range    Hep C Virus Ab <0.1 0.0 - 0.9 s/co ratio    Narrative    Performed at:  [facility]  [address]  [id number]  : Glenn Booth MD, Phone:  [phone]   CBC WITH AUTOMATED DIFF   Result Value Ref Range    WBC 5.0 3.4 - 10.8 x10E3/uL    RBC 4.44 3.77 - 5.28 x10E6/uL    HGB 11.4 11.1 - 15.9 g/dL    HCT 36.2 34.0 - 46.6 %    MCV 82 79 - 97 fL    MCH 25.7 (L) 26.6 - 33.0 pg    MCHC 31.5 31.5 - 35.7 g/dL    RDW 14.6 11.7 - 15.4 %    PLATELET 997 516 - 698 x10E3/uL    NEUTROPHILS 53 Not Estab. %    Lymphocytes 35 Not Estab. %    MONOCYTES 11 Not Estab. %    EOSINOPHILS 1 Not Estab. %    BASOPHILS 0 Not Estab. %    ABS. NEUTROPHILS 2.6 1.4 - 7.0 x10E3/uL    Abs Lymphocytes 1.7 0.7 - 3.1 x10E3/uL    ABS. MONOCYTES 0.5 0.1 - 0.9 x10E3/uL    ABS. EOSINOPHILS 0.0 0.0 - 0.4 x10E3/uL    ABS. BASOPHILS 0.0 0.0 - 0.2 x10E3/uL    IMMATURE GRANULOCYTES 0 Not Estab. %    ABS. IMM.  GRANS. 0.0 0.0 - 0.1 x10E3/uL    Narrative    Performed at:  [facility]  [address]  [id number]  : Glenn Booth MD, Phone:  [phone]   VITAMIN B12   Result Value Ref Range    Vitamin B12 790 232 - 1,245 pg/mL    Narrative    Performed at:  [facility]  [address]  [id number]  : Glenn Booth MD, Phone:  [phone]   METABOLIC PANEL, COMPREHENSIVE   Result Value Ref Range    Glucose 82 65 - 99 mg/dL    BUN 11 6 - 20 mg/dL    Creatinine 0.89 0.57 - 1.00 mg/dL    eGFR 88 >59 mL/min/1.73    BUN/Creatinine ratio 12 9 - 23    Sodium 136 134 - 144 mmol/L    Potassium 4.4 3.5 - 5.2 mmol/L    Chloride 99 96 - 106 mmol/L    CO2 23 20 - 29 mmol/L    Calcium 9.6 8.7 - 10.2 mg/dL    Protein, total 7.7 6.0 - 8.5 g/dL    Albumin 4.9 (H) 3.8 - 4.8 g/dL    GLOBULIN, TOTAL 2.8 1.5 - 4.5 g/dL    A-G Ratio 1.8 1.2 - 2.2    Bilirubin, total 0.2 0.0 - 1.2 mg/dL    Alk.  phosphatase 77 44 - 121 IU/L    AST (SGOT) 22 0 - 40 IU/L    ALT (SGPT) 18 0 - 32 IU/L    Narrative    Performed at:  [address]  [id number]  : Karma Palmer MD, Phone:  [phone]   LIPID PANEL   Result Value Ref Range    Cholesterol, total 178 100 - 199 mg/dL    Triglyceride 77 0 - 149 mg/dL    HDL Cholesterol 66 >39 mg/dL    VLDL, calculated 14 5 - 40 mg/dL    LDL, calculated 98 0 - 99 mg/dL    Narrative    Performed at:  [address]  [id number]  : Karma Palmer MD, Phone:  [phone]   VITAMIN D, 25 HYDROXY   Result Value Ref Range    VITAMIN D, 25-HYDROXY 41.7 30.0 - 100.0 ng/mL    Narrative    Performed at:  [address]  [id number]  : Karma Palmer MD, Phone:  [phone]       RECOMMENDATIONS:    Please call me if you have any questions: [phone]    Sincerely,      Swetha Vaca NP

## 2022-03-28 NOTE — PROGRESS NOTES
Chief Complaint   Patient presents with    Hypertension     Was taking lunesta and stopped the day of last appt and thinks that was her issue     1. Have you been to the ER, urgent care clinic since your last visit? Hospitalized since your last visit? No    2. Have you seen or consulted any other health care providers outside of the [address] [place] [address] since your last visit? Include any pap smears or colon screening.  No     Visit Vitals  BP (!) 128/90 (BP 1 Location: Left upper arm, BP Patient Position: Sitting, BP Cuff Size: Adult)   Pulse 84   Temp 97.2 °F (36.2 °C) (Temporal)   Resp 16   Ht 5' 2\" (1.575 m)   Wt 172 lb (78 kg)   SpO2 100%   BMI 31.46 kg/m²

## 2022-04-06 PROBLEM — Z13.220 SCREENING CHOLESTEROL LEVEL: Status: RESOLVED | Noted: 2021-12-22 | Resolved: 2022-04-06

## 2022-04-12 NOTE — PROGRESS NOTES
Your labs are basically normal.  Some values may be minimally outside the  \"normal\" range but are not harmful or clinically significant. Please contact the office if you have questions or concerns.   We will recheck all your labs at your next office visit

## 2022-07-20 ENCOUNTER — TELEPHONE (OUTPATIENT)
Dept: FAMILY MEDICINE CLINIC | Age: 32
End: 2022-07-20

## 2022-07-20 NOTE — TELEPHONE ENCOUNTER
Patient is experiencing some dizzy spells. They are prescribed amlodipine for high blood pressure, but it appears as though they've been taking the wrong medication (hydrochlorothiazide) which is now in need of a refill. (Both scripts should be due anyway.) Not sure if the apparent mix up is causing the dizziness. Either way, the patient reports that they won't be able to work as the spells worsen as they stand. No other questions or comments were made at the time of call, and the patient made no request for an appt.

## 2022-07-23 NOTE — TELEPHONE ENCOUNTER
Call her and see what she is taking.   Also ask if she has a blood pressure cuff and what does she need a refill on

## 2022-07-28 ENCOUNTER — TELEPHONE (OUTPATIENT)
Dept: FAMILY MEDICINE CLINIC | Age: 32
End: 2022-07-28

## 2022-07-28 NOTE — TELEPHONE ENCOUNTER
Pt figured out due to her symptoms and meds that this was a premenstrual side effect she was having. About 1 month before her period she would feel dizzy, not able to eat, overwhelmed, vomiting. Pt wanted an appt to speak to Sourav Dominguez so Appt was made for Aug 3.

## 2022-08-03 ENCOUNTER — OFFICE VISIT (OUTPATIENT)
Dept: FAMILY MEDICINE CLINIC | Age: 32
End: 2022-08-03
Payer: COMMERCIAL

## 2022-08-03 VITALS
WEIGHT: 173 LBS | TEMPERATURE: 97.3 F | RESPIRATION RATE: 16 BRPM | HEART RATE: 93 BPM | SYSTOLIC BLOOD PRESSURE: 110 MMHG | DIASTOLIC BLOOD PRESSURE: 78 MMHG | OXYGEN SATURATION: 100 % | HEIGHT: 62 IN | BODY MASS INDEX: 31.83 KG/M2

## 2022-08-03 DIAGNOSIS — F51.01 PRIMARY INSOMNIA: ICD-10-CM

## 2022-08-03 DIAGNOSIS — E53.8 VITAMIN B12 DEFICIENCY: ICD-10-CM

## 2022-08-03 DIAGNOSIS — F32.A ANXIETY AND DEPRESSION: ICD-10-CM

## 2022-08-03 DIAGNOSIS — I10 HYPERTENSION, UNSPECIFIED TYPE: ICD-10-CM

## 2022-08-03 DIAGNOSIS — E55.9 VITAMIN D DEFICIENCY: ICD-10-CM

## 2022-08-03 DIAGNOSIS — K44.9 HIATAL HERNIA: ICD-10-CM

## 2022-08-03 DIAGNOSIS — F41.9 ANXIETY AND DEPRESSION: ICD-10-CM

## 2022-08-03 DIAGNOSIS — R22.2 MASS IN CHEST: Primary | ICD-10-CM

## 2022-08-03 PROCEDURE — 99214 OFFICE O/P EST MOD 30 MIN: CPT | Performed by: NURSE PRACTITIONER

## 2022-08-03 NOTE — PROGRESS NOTES
Chief Complaint   Patient presents with    Follow-up     Wants to check in, wants to get MRI done has not been able to get in touch gyn     3 most recent PHQ Screens 8/3/2022   Little interest or pleasure in doing things Several days   Feeling down, depressed, irritable, or hopeless Several days   Total Score PHQ 2 2   Trouble falling or staying asleep, or sleeping too much Nearly every day   Feeling tired or having little energy Nearly every day   Poor appetite, weight loss, or overeating More than half the days   Feeling bad about yourself - or that you are a failure or have let yourself or your family down More than half the days   Trouble concentrating on things such as school, work, reading, or watching TV More than half the days   Moving or speaking so slowly that other people could have noticed; or the opposite being so fidgety that others notice More than half the days   Thoughts of being better off dead, or hurting yourself in some way More than half the days   PHQ 9 Score 18   How difficult have these problems made it for you to do your work, take care of your home and get along with others Somewhat difficult     1. \"Have you been to the ER, urgent care clinic since your last visit? Hospitalized since your last visit? \" No    2. \"Have you seen or consulted any other health care providers outside of the [address] [place] [address] since your last visit? \" No     3. For patients aged 39-70: Has the patient had a colonoscopy / FIT/ Cologuard? No      If the patient is female:    4. For patients aged 41-77: Has the patient had a mammogram within the past 2 years? No      5. For patients aged 21-65: Has the patient had a pap smear?  No  Visit Vitals  Pulse 93   Temp 97.3 °F (36.3 °C) (Temporal)   Resp 16   Ht 5' 2\" (1.575 m)   Wt 173 lb (78.5 kg)   SpO2 100%   BMI 31.64 kg/m²

## 2022-08-03 NOTE — PROGRESS NOTES
Subjective  Chief Complaint   Patient presents with    Follow-up     Wants to check in, wants to get MRI done has not been able to get in touch gyn     HPI:  Ector Saenz is a 33 y.o. female. 34 yo female presents for follow up of her chronic conditions which include anxiety and depression and what appears to be a hiatal hernia. She has had a CT that showed no acute process but the hiatal mass has gotten larger and firmer and her GI thinks that she needs further radiological study after which he plans to do a barium swallow. She is seen by a therapist every Thursday who noticed that her depression was worst leading up to her periods and advised her to take 200 mg for the 2 weeks leading up to her period and this has been helping. Patient has a lot of stress in that she works for [employer] and feels that she is experiencing a hostile work environment and has not had this issue addressed despite numerous complaints.   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    Past Medical History:   Diagnosis Date    Anxiety     Depression      Family History   Problem Relation Age of Onset    Other Mother         pancreticitis    Hypertension Father      Social History     Socioeconomic History    Marital status: SINGLE     Spouse name: Not on file    Number of children: Not on file    Years of education: Not on file    Highest education level: Not on file   Occupational History    Not on file   Tobacco Use    Smoking status: Never    Smokeless tobacco: Never   Vaping Use    Vaping Use: Never used   Substance and Sexual Activity    Alcohol use: Never    Drug use: Never    Sexual activity: Not Currently     Birth control/protection: None   Other Topics Concern    Not on file   Social History Narrative    Not on file     Social Determinants of Health     Financial Resource Strain: Not on file   Food Insecurity: Not on file   Transportation Needs: Not on file   Physical Activity: Not on file   Stress: Not on file Social Connections: Not on file   Intimate Partner Violence: Not on file   Housing Stability: Not on file     Current Outpatient Medications on File Prior to Visit   Medication Sig Dispense Refill    sertraline (ZOLOFT) 100 mg tablet TAKE 1 TABLET BY MOUTH EVERY DAY 90 Tablet 1    hydroCHLOROthiazide (HYDRODIURIL) 25 mg tablet Take 1 Tablet by mouth daily. 90 Tablet 1    amLODIPine (NORVASC) 10 mg tablet Take 1 Tablet by mouth daily. 90 Tablet 1    [DISCONTINUED] eszopiclone (LUNESTA) 2 mg tablet Take 2 tablets at bedtime by mouth (Patient not taking: No sig reported) 180 Tablet 1     No current facility-administered medications on file prior to visit. No Known Allergies  ROS  ROS per HPI and PMH      Objective  Physical Exam  Vitals and nursing note reviewed. HENT:      Head: Normocephalic. Cardiovascular:      Rate and Rhythm: Normal rate and regular rhythm. Pulmonary:      Effort: Pulmonary effort is normal.      Breath sounds: Normal breath sounds. Abdominal:      General: Bowel sounds are normal.      Palpations: Abdomen is soft. Skin:     General: Skin is warm and dry. Neurological:      Mental Status: She is alert and oriented to person, place, and time. Psychiatric:         Mood and Affect: Mood normal.         Thought Content: Thought content normal.         Judgment: Judgment normal.        Assessment & Plan      ICD-10-CM ICD-9-CM    1. Mass in chest  R22.2 786.6 MRI CHEST WO CONT      MRI CHEST WO CONT      2. Hypertension, unspecified type  I10 401.9 CBC WITH AUTOMATED DIFF      METABOLIC PANEL, COMPREHENSIVE      LIPID PANEL      3. Anxiety and depression  F41.9 300.00     F32. A 311       4. Hiatal hernia  K44.9 553.3       5. Primary insomnia  F51.01 307.42       6. Vitamin B12 deficiency  E53.8 266.2 VITAMIN B12      7. Vitamin D deficiency  E55.9 268.9 VITAMIN D, 25 HYDROXY        Diagnoses and all orders for this visit:    1.  Mass in chest  -     MRI CHEST WO CONT; Future  Obtaining MRI at the suggestion of her GI and he will perform a barium swallow when we get the results    2. Hypertension, unspecified type  -     CBC WITH AUTOMATED DIFF  -     METABOLIC PANEL, COMPREHENSIVE  -     LIPID PANEL  Obtaining updated CBC, CMP and lipid panel for trending and will make treatment decisions when I get the results    3. Anxiety and depression  Patient will continue seeing a therapist weekly and will continue on her zoloft    4. Hiatal hernia  Obtaining MRI at the suggestion of her GI and he will perform a barium swallow when we get the results    5. Primary insomnia  Patient is back to taking 50 Mgs of benadryl. Will continue with this medication at this dosage    6. Vitamin B12 deficiency  -     VITAMIN B12  Obtaining updated B12for trending and will make treatment decisions when I get the results    7.  Vitamin D deficiency  -     VITAMIN D, 25 HYDROXY  Obtaining updated Vitamin D panel for trending and will make treatment decisions when I get the results      Shashank Curtis NP

## 2022-08-04 LAB
25(OH)D3+25(OH)D2 SERPL-MCNC: 44.2 NG/ML (ref 30–100)
ALBUMIN SERPL-MCNC: 4.4 G/DL (ref 3.8–4.8)
ALBUMIN/GLOB SERPL: 1.4 {RATIO} (ref 1.2–2.2)
ALP SERPL-CCNC: 80 IU/L (ref 44–121)
ALT SERPL-CCNC: 12 IU/L (ref 0–32)
AST SERPL-CCNC: 16 IU/L (ref 0–40)
BASOPHILS # BLD AUTO: 0 X10E3/UL (ref 0–0.2)
BASOPHILS NFR BLD AUTO: 1 %
BILIRUB SERPL-MCNC: <0.2 MG/DL (ref 0–1.2)
BUN SERPL-MCNC: 13 MG/DL (ref 6–20)
BUN/CREAT SERPL: 15 (ref 9–23)
CALCIUM SERPL-MCNC: 9.7 MG/DL (ref 8.7–10.2)
CHLORIDE SERPL-SCNC: 102 MMOL/L (ref 96–106)
CHOLEST SERPL-MCNC: 177 MG/DL (ref 100–199)
CO2 SERPL-SCNC: 29 MMOL/L (ref 20–29)
CREAT SERPL-MCNC: 0.87 MG/DL (ref 0.57–1)
EGFR: 90 ML/MIN/1.73
EOSINOPHIL # BLD AUTO: 0.1 X10E3/UL (ref 0–0.4)
EOSINOPHIL NFR BLD AUTO: 1 %
ERYTHROCYTE [DISTWIDTH] IN BLOOD BY AUTOMATED COUNT: 14.8 % (ref 11.7–15.4)
GLOBULIN SER CALC-MCNC: 3.2 G/DL (ref 1.5–4.5)
GLUCOSE SERPL-MCNC: 74 MG/DL (ref 65–99)
HCT VFR BLD AUTO: 36.8 % (ref 34–46.6)
HDLC SERPL-MCNC: 64 MG/DL
HGB BLD-MCNC: 11.7 G/DL (ref 11.1–15.9)
IMM GRANULOCYTES # BLD AUTO: 0 X10E3/UL (ref 0–0.1)
IMM GRANULOCYTES NFR BLD AUTO: 0 %
LDLC SERPL CALC-MCNC: 97 MG/DL (ref 0–99)
LYMPHOCYTES # BLD AUTO: 1.5 X10E3/UL (ref 0.7–3.1)
LYMPHOCYTES NFR BLD AUTO: 31 %
MCH RBC QN AUTO: 25.9 PG (ref 26.6–33)
MCHC RBC AUTO-ENTMCNC: 31.8 G/DL (ref 31.5–35.7)
MCV RBC AUTO: 82 FL (ref 79–97)
MONOCYTES # BLD AUTO: 0.4 X10E3/UL (ref 0.1–0.9)
MONOCYTES NFR BLD AUTO: 8 %
NEUTROPHILS # BLD AUTO: 2.9 X10E3/UL (ref 1.4–7)
NEUTROPHILS NFR BLD AUTO: 59 %
PLATELET # BLD AUTO: 259 X10E3/UL (ref 150–450)
POTASSIUM SERPL-SCNC: 4.1 MMOL/L (ref 3.5–5.2)
PROT SERPL-MCNC: 7.6 G/DL (ref 6–8.5)
RBC # BLD AUTO: 4.51 X10E6/UL (ref 3.77–5.28)
SODIUM SERPL-SCNC: 143 MMOL/L (ref 134–144)
TRIGL SERPL-MCNC: 86 MG/DL (ref 0–149)
VIT B12 SERPL-MCNC: 722 PG/ML (ref 232–1245)
VLDLC SERPL CALC-MCNC: 16 MG/DL (ref 5–40)
WBC # BLD AUTO: 4.9 X10E3/UL (ref 3.4–10.8)

## 2022-08-16 ENCOUNTER — TELEPHONE (OUTPATIENT)
Dept: FAMILY MEDICINE CLINIC | Age: 32
End: 2022-08-16

## 2022-08-16 NOTE — TELEPHONE ENCOUNTER
Left a detailed message on Linda's secured voicemail box. [address] form I completed this morning was returned, stating that I have to go through [place] for the authorization. Called them at [phone]. Case #[id number]  Information was given to start process, but they needed medical records to indicate medical necessity. I sent office notes from 03/07/20, 03/10/22, 08/03/22, CT from 03/09/22, [facility] notes from 04/29/22. Asked for a quick response, since imaging is scheduled for tomorrow 08/17/22.

## 2022-08-16 NOTE — TELEPHONE ENCOUNTER
Reason for call: Linda from [employer] calling--they are requesting an authorization number for the pt. Requesting a call back.     Is this a new problem: yes     Date of last appointment:  8/3/2022     Can we respond via [employer]: no    Best call back number: [phone]

## 2022-08-22 ENCOUNTER — TELEPHONE (OUTPATIENT)
Dept: FAMILY MEDICINE CLINIC | Age: 32
End: 2022-08-22

## 2022-08-22 NOTE — TELEPHONE ENCOUNTER
Pt had the Ct done and request a follow up call with the radiologist recommendations. She said she has started to notice some chest pains when she has to labor her breathing like when she was in the MRI . 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

## 2022-08-28 DIAGNOSIS — L72.3 SEBACEOUS CYST: Primary | ICD-10-CM

## 2022-08-29 ENCOUNTER — TELEPHONE (OUTPATIENT)
Dept: FAMILY MEDICINE CLINIC | Age: 32
End: 2022-08-29

## 2022-08-29 NOTE — TELEPHONE ENCOUNTER
Per TSS: Please let her now that the MRI showed what appears to be a 2.6 x 2.1 sebaceous cyst.  These are usually small, slow growing, non cancerous bumps below the skin and may develop as the result of trauma or a blocked gland.   I am referring to dermatology for further evaluation and treatment         Dermatology referral for sebaceous cyst      [facility] [address], [place], [place] [address]   Hours:   Closed · Opens 7:45AM Mon   Phone: [phone]         Left Message for pt to call back

## 2022-08-30 ENCOUNTER — TELEPHONE (OUTPATIENT)
Dept: FAMILY MEDICINE CLINIC | Age: 32
End: 2022-08-30

## 2022-08-30 NOTE — TELEPHONE ENCOUNTER
Pt was contacted and advised of MRI results and given contact information for the dermatologist that she was referred to. Apologized for the hold time that she had. Sending to  as Thuan Seals.

## 2022-08-30 NOTE — TELEPHONE ENCOUNTER
----- Message from Nathan Mccullough sent at 8/30/2022  3:24 PM EDT -----  Subject: Message to Provider    QUESTIONS  Information for Provider? pt returning a call from nacho. pt was   holding with [facility] for over 20 mins, needs callback ASAP. pt wants to speak   with  regarding her hold time. pt disconnected right as the   office picked up. needs callback ASAP.   ---------------------------------------------------------------------------  --------------  [address]  [phone]; Do not leave any message, patient will call back for answer  ---------------------------------------------------------------------------  --------------  SCRIPT ANSWERS  Relationship to Patient? Third Party  Third Party Type? Physician Office? Representative Name?  [facility]

## 2022-09-01 NOTE — TELEPHONE ENCOUNTER
I left a message for the patient to call me back, it's regarding her dermatology referral.    [facility] is non-par with her insurance, so she will need to be referred somewhere else. I want to ask if she would call her insurance to see who is participating with her plan, or [facility] (not sure if they participate).

## 2022-12-15 RX ORDER — SERTRALINE HYDROCHLORIDE 100 MG/1
TABLET, FILM COATED ORAL
Qty: 90 TABLET | Refills: 1 | Status: SHIPPED | OUTPATIENT
Start: 2022-12-15

## 2022-12-27 ENCOUNTER — OFFICE VISIT (OUTPATIENT)
Dept: FAMILY MEDICINE CLINIC | Age: 32
End: 2022-12-27
Payer: COMMERCIAL

## 2022-12-27 VITALS
WEIGHT: 182 LBS | BODY MASS INDEX: 32.25 KG/M2 | RESPIRATION RATE: 14 BRPM | HEIGHT: 63 IN | SYSTOLIC BLOOD PRESSURE: 119 MMHG | DIASTOLIC BLOOD PRESSURE: 71 MMHG | HEART RATE: 90 BPM | OXYGEN SATURATION: 99 % | TEMPERATURE: 98.1 F

## 2022-12-27 DIAGNOSIS — F32.A ANXIETY AND DEPRESSION: Primary | ICD-10-CM

## 2022-12-27 DIAGNOSIS — F32.81 PMDD (PREMENSTRUAL DYSPHORIC DISORDER): ICD-10-CM

## 2022-12-27 DIAGNOSIS — N39.0 URINARY TRACT INFECTION WITHOUT HEMATURIA, SITE UNSPECIFIED: ICD-10-CM

## 2022-12-27 DIAGNOSIS — F41.9 ANXIETY AND DEPRESSION: Primary | ICD-10-CM

## 2022-12-27 LAB
BILIRUB UR QL STRIP: NEGATIVE
GLUCOSE UR-MCNC: NEGATIVE MG/DL
KETONES P FAST UR STRIP-MCNC: NEGATIVE MG/DL
PH UR STRIP: 5.5 [PH] (ref 4.6–8)
PROT UR QL STRIP: NEGATIVE
SP GR UR STRIP: 1 (ref 1–1.03)
UA UROBILINOGEN AMB POC: NORMAL (ref 0.2–1)
URINALYSIS CLARITY POC: CLEAR
URINALYSIS COLOR POC: NORMAL
URINE BLOOD POC: NORMAL
URINE LEUKOCYTES POC: NORMAL
URINE NITRITES POC: NEGATIVE

## 2022-12-27 PROCEDURE — 3074F SYST BP LT 130 MM HG: CPT | Performed by: STUDENT IN AN ORGANIZED HEALTH CARE EDUCATION/TRAINING PROGRAM

## 2022-12-27 PROCEDURE — 99214 OFFICE O/P EST MOD 30 MIN: CPT | Performed by: STUDENT IN AN ORGANIZED HEALTH CARE EDUCATION/TRAINING PROGRAM

## 2022-12-27 PROCEDURE — 3078F DIAST BP <80 MM HG: CPT | Performed by: STUDENT IN AN ORGANIZED HEALTH CARE EDUCATION/TRAINING PROGRAM

## 2022-12-27 PROCEDURE — 81002 URINALYSIS NONAUTO W/O SCOPE: CPT | Performed by: STUDENT IN AN ORGANIZED HEALTH CARE EDUCATION/TRAINING PROGRAM

## 2022-12-27 RX ORDER — SERTRALINE HYDROCHLORIDE 100 MG/1
200 TABLET, FILM COATED ORAL DAILY
Qty: 180 TABLET | Refills: 1 | Status: SHIPPED | OUTPATIENT
Start: 2022-12-27

## 2022-12-27 RX ORDER — PHENAZOPYRIDINE HYDROCHLORIDE 200 MG/1
200 TABLET, FILM COATED ORAL
Qty: 30 TABLET | Refills: 0 | Status: SHIPPED | OUTPATIENT
Start: 2022-12-27 | End: 2022-12-30

## 2022-12-27 RX ORDER — NITROFURANTOIN 25; 75 MG/1; MG/1
100 CAPSULE ORAL 2 TIMES DAILY
Qty: 10 CAPSULE | Refills: 0 | Status: SHIPPED | OUTPATIENT
Start: 2022-12-27

## 2022-12-27 NOTE — PROGRESS NOTES
Chief Complaint   Patient presents with    UTI    Medication Refill     Zoloft needs refill     Visit Vitals  /71 (BP 1 Location: Left upper arm, BP Patient Position: Sitting)   Pulse 90   Temp 98.1 °F (36.7 °C) (Axillary)   Resp 14   Ht 5' 3\" (1.6 m)   Wt 182 lb (82.6 kg)   SpO2 99%   BMI 32.24 kg/m²     1. Have you been to the ER, urgent care clinic since your last visit? Hospitalized since your last visit? No    2. Have you seen or consulted any other health care providers outside of the [address] [place] [address] since your last visit? Include any pap smears or colon screening.  No

## 2022-12-27 NOTE — PROGRESS NOTES
Good          Subjective:     Chief Complaint   Patient presents with    UTI    Medication Refill     Zoloft needs refill     HPI:  Kb Babin is a 27 y.o. female who presents with UTI symptoms including frequency, suprapubic pain, and bilateral low back pain. Symptoms started a couple days ago, and due to pain she has been having difficulty sleeping. Has been taking Tylenol which helps. No known fever. POCT UA today is positive for leukocytes. History of anxiety and PMDD. Follows with therapist as well. Joanne Barnett PMDD she has been taking 200 mg of Zoloft near time of her period for a couple days and goes back down to 100 mg daily. Because of this she has been running out of her Zoloft early. States there is a lot going on in her life causing her stress including changing jobs, and school. 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 She has tried Zoloft 200 mg every day but that was too much for her. Denies any withdrawal effects. Patient Active Problem List    Diagnosis    Hypertension    Hiatal hernia    Vitamin D deficiency    Vitamin B12 deficiency    Anxiety and depression    Primary insomnia    Decrease in appetite     Past Medical History:   Diagnosis Date    Anxiety     Depression      Family History   Problem Relation Age of Onset    Other Mother         pancreticitis    Hypertension Father       reports that she has never smoked. She has never used smokeless tobacco. She reports that she does not drink alcohol and does not use drugs. Current Outpatient Medications on File Prior to Visit   Medication Sig Dispense Refill    hydroCHLOROthiazide (HYDRODIURIL) 25 mg tablet TAKE 1 TABLET BY MOUTH EVERY DAY 90 Tablet 1    amLODIPine (NORVASC) 10 mg tablet TAKE 1 TABLET BY MOUTH EVERY DAY 90 Tablet 1    [DISCONTINUED] sertraline (ZOLOFT) 100 mg tablet TAKE 1 TABLET BY MOUTH EVERY DAY 90 Tablet 1     No current facility-administered medications on file prior to visit.      No Known Allergies  Review of Systems   All other systems reviewed and are negative. Objective:     Vitals:    12/27/22 1401   BP: 119/71   Pulse: 90   Resp: 14   Temp: 98.1 °F (36.7 °C)   TempSrc: Axillary   SpO2: 99%   Weight: 182 lb (82.6 kg)   Height: 5' 3\" (1.6 m)     Physical Exam  Vitals reviewed. Constitutional:       Appearance: Normal appearance. HENT:      Head: Normocephalic and atraumatic. Cardiovascular:      Rate and Rhythm: Normal rate. Pulmonary:      Effort: Pulmonary effort is normal.   Abdominal:      Tenderness: There is abdominal tenderness in the suprapubic area. There is right CVA tenderness and left CVA tenderness. Neurological:      Mental Status: She is alert and oriented to person, place, and time. Psychiatric:         Behavior: Behavior normal.          Assessment/Plan:       Diagnoses and all orders for this visit:    1. Anxiety and depression  2. PMDD (premenstrual dysphoric disorder)  -Continue current management which includes taking 200 mg of Zoloft for the time of her.,  And 100 mg daily the rest of the month. Zoloft refilled. -     sertraline (ZOLOFT) 100 mg tablet; Take 2 Tablets by mouth daily. 3. Urinary tract infection without hematuria, site unspecified  -   UA positive for UTI. Treatment and urine culture ordered. -     nitrofurantoin, macrocrystal-monohydrate, (Macrobid) 100 mg capsule; Take 1 Capsule by mouth two (2) times a day. -     phenazopyridine (PYRIDIUM) 200 mg tablet; Take 1 Tablet by mouth three (3) times daily as needed for Pain for up to 3 days.   -     CULTURE, URINE  -     AMB POC URINALYSIS DIP STICK MANUAL W/O MICRO    Follow-up as needed       Rufino Aburto MD

## 2022-12-28 PROBLEM — F32.81 PMDD (PREMENSTRUAL DYSPHORIC DISORDER): Status: ACTIVE | Noted: 2022-12-28

## 2022-12-30 LAB — BACTERIA UR CULT: NORMAL

## 2023-01-30 ENCOUNTER — OFFICE VISIT (OUTPATIENT)
Dept: FAMILY MEDICINE CLINIC | Age: 33
End: 2023-01-30
Payer: COMMERCIAL

## 2023-01-30 VITALS
DIASTOLIC BLOOD PRESSURE: 88 MMHG | WEIGHT: 187 LBS | TEMPERATURE: 97.6 F | HEIGHT: 63 IN | RESPIRATION RATE: 16 BRPM | HEART RATE: 101 BPM | SYSTOLIC BLOOD PRESSURE: 144 MMHG | OXYGEN SATURATION: 100 % | BODY MASS INDEX: 33.13 KG/M2

## 2023-01-30 DIAGNOSIS — R10.2 PELVIC PAIN: Primary | ICD-10-CM

## 2023-01-30 LAB
BILIRUB UR QL STRIP: NEGATIVE
GLUCOSE UR-MCNC: NEGATIVE MG/DL
KETONES P FAST UR STRIP-MCNC: NEGATIVE MG/DL
PH UR STRIP: 5.5 [PH] (ref 4.6–8)
PROT UR QL STRIP: NEGATIVE
SP GR UR STRIP: 1.02 (ref 1–1.03)
UA UROBILINOGEN AMB POC: NORMAL (ref 0.2–1)
URINALYSIS CLARITY POC: CLEAR
URINALYSIS COLOR POC: YELLOW
URINE BLOOD POC: NORMAL
URINE LEUKOCYTES POC: NORMAL
URINE NITRITES POC: NEGATIVE

## 2023-01-30 PROCEDURE — 3079F DIAST BP 80-89 MM HG: CPT | Performed by: STUDENT IN AN ORGANIZED HEALTH CARE EDUCATION/TRAINING PROGRAM

## 2023-01-30 PROCEDURE — 81003 URINALYSIS AUTO W/O SCOPE: CPT | Performed by: STUDENT IN AN ORGANIZED HEALTH CARE EDUCATION/TRAINING PROGRAM

## 2023-01-30 PROCEDURE — 99214 OFFICE O/P EST MOD 30 MIN: CPT | Performed by: STUDENT IN AN ORGANIZED HEALTH CARE EDUCATION/TRAINING PROGRAM

## 2023-01-30 PROCEDURE — 3077F SYST BP >= 140 MM HG: CPT | Performed by: STUDENT IN AN ORGANIZED HEALTH CARE EDUCATION/TRAINING PROGRAM

## 2023-01-30 NOTE — PROGRESS NOTES
Subjective:     Chief Complaint   Patient presents with    Abdominal Pain     Diarrhea x 1 week -      HPI:  Gilford Mike is a 27 y.o. female with    Pelvic pain. Pain has been going for about a week. Pain comes and goes on its own. Patient also recently had abnormal menstruation. In which she had a period for 2 days which was only light bleeding and stopped. She is not on any contraception. Recently had sexual intercourse the first time and 4 years. hCG pregnancy test was negative today. Also states having frequency. No pastern about 3 years. 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 Usually has normal period. Patient Active Problem List    Diagnosis    PMDD (premenstrual dysphoric disorder)    Hypertension    Hiatal hernia    Vitamin D deficiency    Vitamin B12 deficiency    Anxiety and depression    Primary insomnia    Decrease in appetite     Past Medical History:   Diagnosis Date    Anxiety     Depression      Family History   Problem Relation Age of Onset    Other Mother         pancreticitis    Hypertension Father       reports that she has never smoked. She has never used smokeless tobacco. She reports that she does not drink alcohol and does not use drugs. Current Outpatient Medications on File Prior to Visit   Medication Sig Dispense Refill    sertraline (ZOLOFT) 100 mg tablet Take 2 Tablets by mouth daily. 180 Tablet 1    [DISCONTINUED] nitrofurantoin, macrocrystal-monohydrate, (Macrobid) 100 mg capsule Take 1 Capsule by mouth two (2) times a day.  (Patient not taking: Reported on 1/30/2023) 10 Capsule 0    hydroCHLOROthiazide (HYDRODIURIL) 25 mg tablet TAKE 1 TABLET BY MOUTH EVERY DAY (Patient not taking: Reported on 1/30/2023) 90 Tablet 1    amLODIPine (NORVASC) 10 mg tablet TAKE 1 TABLET BY MOUTH EVERY DAY (Patient not taking: Reported on 1/30/2023) 90 Tablet 1     No current facility-administered medications on file prior to visit. No Known Allergies  Review of Systems   All other systems reviewed and are negative. Objective:     Vitals:    01/30/23 1331   BP: (!) 144/88   Pulse: (!) 101   Resp: 16   Temp: 97.6 °F (36.4 °C)   TempSrc: Axillary   SpO2: 100%   Weight: 187 lb (84.8 kg)   Height: 5' 3\" (1.6 m)     Physical Exam  Vitals reviewed. Constitutional:       Appearance: Normal appearance. HENT:      Head: Normocephalic and atraumatic. Cardiovascular:      Rate and Rhythm: Normal rate. Pulmonary:      Effort: Pulmonary effort is normal.   Abdominal:      Tenderness: There is abdominal tenderness (Left pelvic area pressure and give her sensation of having to pee). Neurological:      Mental Status: She is alert and oriented to person, place, and time. Psychiatric:         Behavior: Behavior normal.        Results for orders placed or performed in visit on 01/30/23   AMB POC URINALYSIS DIP STICK AUTO W/O MICRO   Result Value Ref Range    Color (UA POC) Yellow     Clarity (UA POC) Clear     Glucose (UA POC) Negative Negative    Bilirubin (UA POC) Negative Negative    Ketones (UA POC) Negative Negative    Specific gravity (UA POC) 1.025 1.001 - 1.035    Blood (UA POC) 4+ Negative    pH (UA POC) 5.5 4.6 - 8.0    Protein (UA POC) Negative Negative    Urobilinogen (UA POC) 0.2 mg/dL 0.2 - 1    Nitrites (UA POC) Negative Negative    Leukocyte esterase (UA POC) Trace Negative      Assessment/Plan:       Diagnoses and all orders for this visit:    1. Pelvic pain  -     US PELV NON OBS; Future  -     CHLAMYDIA / GC-AMPLIFIED  -     CULTURE, URINE    I suspect pelvic pain me gynecological vs stress related/ibs. Highly unlikely PUD. Will Consider GI cause. POCT UA is just mildly positive, urine culture sent. Follow-up ultrasound. Advised to make an appointment with OB. May need CAT scan.   She will get Pap smear done with gynecologist.    We will schedule follow-up as needed once ultrasound is back.        Sheri Majano MD

## 2023-01-30 NOTE — PROGRESS NOTES
Chief Complaint   Patient presents with    Abdominal Pain     Diarrhea x 1 week -      Visit Vitals  BP (!) 144/88 (BP 1 Location: Left upper arm, BP Patient Position: Sitting)   Pulse (!) 101   Temp 97.6 °F (36.4 °C) (Axillary)   Resp 16   Ht 5' 3\" (1.6 m)   Wt 187 lb (84.8 kg)   SpO2 100%   BMI 33.13 kg/m²       1. Have you been to the ER, urgent care clinic since your last visit? Hospitalized since your last visit? No    2. Have you seen or consulted any other health care providers outside of the [address] [place] [address] since your last visit? Include any pap smears or colon screening.  No

## 2023-02-01 LAB
BACTERIA UR CULT: NORMAL
C TRACH RRNA SPEC QL NAA+PROBE: NEGATIVE
N GONORRHOEA RRNA SPEC QL NAA+PROBE: NEGATIVE

## 2023-02-03 ENCOUNTER — TELEPHONE (OUTPATIENT)
Dept: FAMILY MEDICINE CLINIC | Age: 33
End: 2023-02-03

## 2023-02-03 NOTE — TELEPHONE ENCOUNTER
----- Message from Severogary Alberts sent at 2/3/2023  1:36 PM EST -----  Subject: Message to Provider    QUESTIONS  Information for Provider? Pt returning call, please call again.  Your phone   lines are ringing then a [employer] message asks for an extension.   ---------------------------------------------------------------------------  --------------  [place]  [phone]; OK to leave message on voicemail  ---------------------------------------------------------------------------  --------------  SCRIPT ANSWERS  undefined

## 2023-02-05 DIAGNOSIS — R31.9 HEMATURIA, UNSPECIFIED TYPE: Primary | ICD-10-CM

## 2023-02-05 DIAGNOSIS — R10.2 PELVIC PAIN: ICD-10-CM

## 2023-02-05 NOTE — PROGRESS NOTES
Please let her know her urine testing was negative for infection. There was blood seen in her urine, but this should be investigated as well. Findings suggest she sees a urologist as well. Urology will be consulted. Still waiting to get ultrasound done. She is to see her gynecologist as discussed as well. Urology consulted.

## 2023-02-07 ENCOUNTER — TELEPHONE (OUTPATIENT)
Dept: UROLOGY | Age: 33
End: 2023-02-07

## 2023-02-08 ENCOUNTER — HOSPITAL ENCOUNTER (OUTPATIENT)
Dept: MAMMOGRAPHY | Age: 33
Discharge: HOME | End: 2023-02-08
Attending: STUDENT IN AN ORGANIZED HEALTH CARE EDUCATION/TRAINING PROGRAM

## 2023-02-08 DIAGNOSIS — R10.2 PELVIC PAIN: ICD-10-CM

## 2023-02-15 PROBLEM — R35.0 URINARY FREQUENCY: Status: ACTIVE | Noted: 2023-02-15

## 2023-02-15 PROBLEM — R31.29 MICROHEMATURIA: Status: ACTIVE | Noted: 2023-02-15

## 2023-02-17 ENCOUNTER — TELEPHONE (OUTPATIENT)
Dept: UROLOGY | Age: 33
End: 2023-02-17

## 2023-02-17 NOTE — TELEPHONE ENCOUNTER
Lvm for pt who left a message about rescheduling her appt today due to her running a fever.  Asked to call the office to rescheduled

## 2023-02-21 DIAGNOSIS — Z13.0 SCREENING FOR DEFICIENCY ANEMIA: ICD-10-CM

## 2023-02-21 DIAGNOSIS — E53.8 VITAMIN B12 DEFICIENCY: ICD-10-CM

## 2023-02-21 DIAGNOSIS — E55.9 VITAMIN D DEFICIENCY: ICD-10-CM

## 2023-02-21 DIAGNOSIS — Z13.228 ENCOUNTER FOR SCREENING FOR OTHER METABOLIC DISORDERS: ICD-10-CM

## 2023-02-21 DIAGNOSIS — Z13.29 THYROID DISORDER SCREENING: ICD-10-CM

## 2023-02-21 DIAGNOSIS — Z13.220 SCREENING CHOLESTEROL LEVEL: ICD-10-CM

## 2023-02-21 DIAGNOSIS — R31.29 MICROHEMATURIA: ICD-10-CM

## 2023-02-21 DIAGNOSIS — I10 HYPERTENSION, UNSPECIFIED TYPE: Primary | ICD-10-CM

## 2023-02-21 DIAGNOSIS — R53.83 FATIGUE, UNSPECIFIED TYPE: ICD-10-CM

## 2023-06-05 RX ORDER — HYDROCHLOROTHIAZIDE 25 MG/1
TABLET ORAL
Qty: 90 TABLET | Refills: 1 | Status: SHIPPED | OUTPATIENT
Start: 2023-06-05

## 2023-06-05 RX ORDER — AMLODIPINE BESYLATE 10 MG/1
TABLET ORAL
Qty: 90 TABLET | Refills: 1 | Status: SHIPPED | OUTPATIENT
Start: 2023-06-05

## 2023-06-12 ENCOUNTER — TELEPHONE (OUTPATIENT)
Facility: CLINIC | Age: 33
End: 2023-06-12

## 2023-07-04 DIAGNOSIS — F51.01 PRIMARY INSOMNIA: ICD-10-CM

## 2023-07-06 ENCOUNTER — OFFICE VISIT (OUTPATIENT)
Facility: CLINIC | Age: 33
End: 2023-07-06
Payer: COMMERCIAL

## 2023-07-06 VITALS
HEART RATE: 78 BPM | HEIGHT: 63 IN | BODY MASS INDEX: 33.66 KG/M2 | RESPIRATION RATE: 16 BRPM | DIASTOLIC BLOOD PRESSURE: 84 MMHG | WEIGHT: 190 LBS | TEMPERATURE: 97.5 F | OXYGEN SATURATION: 97 % | SYSTOLIC BLOOD PRESSURE: 118 MMHG

## 2023-07-06 DIAGNOSIS — E53.8 VITAMIN B12 DEFICIENCY: ICD-10-CM

## 2023-07-06 DIAGNOSIS — E55.9 VITAMIN D DEFICIENCY, UNSPECIFIED: ICD-10-CM

## 2023-07-06 DIAGNOSIS — E55.9 VITAMIN D DEFICIENCY: Primary | ICD-10-CM

## 2023-07-06 DIAGNOSIS — Z13.220 SCREENING CHOLESTEROL LEVEL: ICD-10-CM

## 2023-07-06 DIAGNOSIS — Z13.1 DIABETES MELLITUS SCREENING: ICD-10-CM

## 2023-07-06 DIAGNOSIS — Z13.228 ENCOUNTER FOR SCREENING FOR OTHER METABOLIC DISORDERS: ICD-10-CM

## 2023-07-06 DIAGNOSIS — Z13.0 SCREENING FOR DEFICIENCY ANEMIA: ICD-10-CM

## 2023-07-06 DIAGNOSIS — Z11.4 ENCOUNTER FOR SCREENING FOR HIV: ICD-10-CM

## 2023-07-06 PROCEDURE — 3078F DIAST BP <80 MM HG: CPT | Performed by: NURSE PRACTITIONER

## 2023-07-06 PROCEDURE — 3074F SYST BP LT 130 MM HG: CPT | Performed by: NURSE PRACTITIONER

## 2023-07-06 PROCEDURE — 99214 OFFICE O/P EST MOD 30 MIN: CPT | Performed by: NURSE PRACTITIONER

## 2023-07-06 ASSESSMENT — PATIENT HEALTH QUESTIONNAIRE - PHQ9
2. FEELING DOWN, DEPRESSED OR HOPELESS: 1
SUM OF ALL RESPONSES TO PHQ QUESTIONS 1-9: 2
1. LITTLE INTEREST OR PLEASURE IN DOING THINGS: 1
SUM OF ALL RESPONSES TO PHQ9 QUESTIONS 1 & 2: 2
SUM OF ALL RESPONSES TO PHQ QUESTIONS 1-9: 2

## 2023-07-07 ENCOUNTER — TELEPHONE (OUTPATIENT)
Facility: CLINIC | Age: 33
End: 2023-07-07

## 2023-07-07 LAB
25(OH)D3+25(OH)D2 SERPL-MCNC: 33 NG/ML (ref 30–100)
ALBUMIN SERPL-MCNC: 4.4 G/DL (ref 3.8–4.8)
ALBUMIN/GLOB SERPL: 1.3 {RATIO} (ref 1.2–2.2)
ALP SERPL-CCNC: 87 IU/L (ref 44–121)
ALT SERPL-CCNC: 12 IU/L (ref 0–32)
AST SERPL-CCNC: 15 IU/L (ref 0–40)
BASOPHILS # BLD AUTO: 0 X10E3/UL (ref 0–0.2)
BASOPHILS NFR BLD AUTO: 1 %
BILIRUB SERPL-MCNC: 0.2 MG/DL (ref 0–1.2)
BUN SERPL-MCNC: 13 MG/DL (ref 6–20)
BUN/CREAT SERPL: 15 (ref 9–23)
CALCIUM SERPL-MCNC: 9.5 MG/DL (ref 8.7–10.2)
CHLORIDE SERPL-SCNC: 96 MMOL/L (ref 96–106)
CHOLEST SERPL-MCNC: 179 MG/DL (ref 100–199)
CO2 SERPL-SCNC: 27 MMOL/L (ref 20–29)
CREAT SERPL-MCNC: 0.84 MG/DL (ref 0.57–1)
EGFRCR SERPLBLD CKD-EPI 2021: 93 ML/MIN/1.73
EOSINOPHIL # BLD AUTO: 0.2 X10E3/UL (ref 0–0.4)
EOSINOPHIL NFR BLD AUTO: 3 %
ERYTHROCYTE [DISTWIDTH] IN BLOOD BY AUTOMATED COUNT: 16.1 % (ref 11.7–15.4)
GLOBULIN SER CALC-MCNC: 3.5 G/DL (ref 1.5–4.5)
GLUCOSE SERPL-MCNC: 75 MG/DL (ref 70–99)
HBA1C MFR BLD: 5.7 % (ref 4.8–5.6)
HCT VFR BLD AUTO: 34 % (ref 34–46.6)
HDLC SERPL-MCNC: 62 MG/DL
HGB BLD-MCNC: 10.6 G/DL (ref 11.1–15.9)
HIV 1+2 AB+HIV1 P24 AG SERPL QL IA: NON REACTIVE
IMM GRANULOCYTES # BLD AUTO: 0 X10E3/UL (ref 0–0.1)
IMM GRANULOCYTES NFR BLD AUTO: 0 %
LDLC SERPL CALC-MCNC: 103 MG/DL (ref 0–99)
LYMPHOCYTES # BLD AUTO: 1.9 X10E3/UL (ref 0.7–3.1)
LYMPHOCYTES NFR BLD AUTO: 31 %
MCH RBC QN AUTO: 23.7 PG (ref 26.6–33)
MCHC RBC AUTO-ENTMCNC: 31.2 G/DL (ref 31.5–35.7)
MCV RBC AUTO: 76 FL (ref 79–97)
MONOCYTES # BLD AUTO: 0.5 X10E3/UL (ref 0.1–0.9)
MONOCYTES NFR BLD AUTO: 8 %
NEUTROPHILS # BLD AUTO: 3.6 X10E3/UL (ref 1.4–7)
NEUTROPHILS NFR BLD AUTO: 57 %
PLATELET # BLD AUTO: 295 X10E3/UL (ref 150–450)
POTASSIUM SERPL-SCNC: 3.7 MMOL/L (ref 3.5–5.2)
PROT SERPL-MCNC: 7.9 G/DL (ref 6–8.5)
RBC # BLD AUTO: 4.47 X10E6/UL (ref 3.77–5.28)
SODIUM SERPL-SCNC: 137 MMOL/L (ref 134–144)
TRIGL SERPL-MCNC: 77 MG/DL (ref 0–149)
VIT B12 SERPL-MCNC: 626 PG/ML (ref 232–1245)
VLDLC SERPL CALC-MCNC: 14 MG/DL (ref 5–40)
WBC # BLD AUTO: 6.3 X10E3/UL (ref 3.4–10.8)

## 2023-07-07 RX ORDER — ESZOPICLONE 2 MG/1
TABLET, FILM COATED ORAL
Qty: 30 TABLET | Refills: 0 | Status: SHIPPED | OUTPATIENT
Start: 2023-07-07 | End: 2023-08-04

## 2023-07-07 NOTE — TELEPHONE ENCOUNTER
Pt called in regards to needing a Lunesta refill to [facility] at [address] [place] [address]  Last visit 07/06/2023

## 2023-08-11 ENCOUNTER — TELEMEDICINE (OUTPATIENT)
Facility: CLINIC | Age: 33
End: 2023-08-11
Payer: COMMERCIAL

## 2023-08-11 DIAGNOSIS — F51.01 PRIMARY INSOMNIA: ICD-10-CM

## 2023-08-11 DIAGNOSIS — R09.81 SINUS CONGESTION: Primary | ICD-10-CM

## 2023-08-11 PROCEDURE — 99214 OFFICE O/P EST MOD 30 MIN: CPT | Performed by: NURSE PRACTITIONER

## 2023-08-11 RX ORDER — PREDNISONE 20 MG/1
20 TABLET ORAL DAILY
Qty: 7 TABLET | Refills: 0 | Status: SHIPPED | OUTPATIENT
Start: 2023-08-11 | End: 2023-08-18

## 2023-08-11 RX ORDER — FLUTICASONE PROPIONATE 50 MCG
2 SPRAY, SUSPENSION (ML) NASAL DAILY
Qty: 16 G | Refills: 0 | Status: SHIPPED | OUTPATIENT
Start: 2023-08-11

## 2023-08-11 RX ORDER — ESZOPICLONE 2 MG/1
2 TABLET, FILM COATED ORAL NIGHTLY
Qty: 90 TABLET | Refills: 1 | Status: SHIPPED | OUTPATIENT
Start: 2023-08-11 | End: 2024-02-07

## 2023-08-11 RX ORDER — ESZOPICLONE 2 MG/1
2 TABLET, FILM COATED ORAL NIGHTLY
COMMUNITY
End: 2023-08-11 | Stop reason: SDUPTHER

## 2023-08-11 SDOH — ECONOMIC STABILITY: FOOD INSECURITY: WITHIN THE PAST 12 MONTHS, YOU WORRIED THAT YOUR FOOD WOULD RUN OUT BEFORE YOU GOT MONEY TO BUY MORE.: NEVER TRUE

## 2023-08-11 SDOH — ECONOMIC STABILITY: FOOD INSECURITY: WITHIN THE PAST 12 MONTHS, THE FOOD YOU BOUGHT JUST DIDN'T LAST AND YOU DIDN'T HAVE MONEY TO GET MORE.: NEVER TRUE

## 2023-08-11 SDOH — ECONOMIC STABILITY: INCOME INSECURITY: HOW HARD IS IT FOR YOU TO PAY FOR THE VERY BASICS LIKE FOOD, HOUSING, MEDICAL CARE, AND HEATING?: NOT HARD AT ALL

## 2023-08-11 SDOH — ECONOMIC STABILITY: HOUSING INSECURITY
IN THE LAST 12 MONTHS, WAS THERE A TIME WHEN YOU DID NOT HAVE A STEADY PLACE TO SLEEP OR SLEPT IN A SHELTER (INCLUDING NOW)?: NO

## 2023-08-11 NOTE — PROGRESS NOTES
Ling aHnd (:  1990) is a Established patient, presenting virtually for evaluation of the followin-year-old female presents with complaints of what she terms of sinusitis with congestion and a cough. It started last week with mucus drainage, sore throat, cough and elevated temperature of 101.0. The elevated temperature and the cough and mucus drainage lasted until Friday and the cough and sore throat persisted till Monday and right now all she has is congestion. While at the height of her symptoms she treated her self with Sudafed H BP, lemon tea and over-the-counter pills for congestion. Patient also has a question about whether her Nerissaann Beltran can be called in 90 days at a time. Assessment & Plan   Below is the assessment and plan developed based on review of pertinent history, physical exam, labs, studies, and medications. 1. Sinus congestion  We will treat symptoms with a prednisone burst and Flonase and patient will let me know if this does not ameliorate her symptoms. 2. Primary insomnia  -     eszopiclone (LUNESTA) 2 MG TABS; Take 1 tablet by mouth nightly for 180 days. Max Daily Amount: 2 mg, Disp-90 tablet, R-1Normal  Refill of Lunesta called and at a 3-month supply. Return in about 5 months (around 2024) for annual wellness. Subjective   Sinusitis    Medication Refill    Review of Systems  ROS per HPI and PMH       Objective   Patient-Reported Vitals  BP Observations: No, remote/electronic monitoring device was not used or able to be verified       Physical Exam  Neurological:      Mental Status: She is alert and oriented to person, place, and time. Psychiatric:         Mood and Affect: Mood normal.         Behavior: Behavior normal.         Thought Content: Thought content normal.         Judgment: Judgment normal.                Ananda Land, was evaluated through a synchronous (real-time) audio-video encounter.  The patient (or guardian if applicable) is aware

## 2023-08-11 NOTE — PROGRESS NOTES
Chief Complaint   Patient presents with    Sinusitis     Congestion    Medication Refill     Patient wants to know if lunesta can be 3 month supply? No data recorded    1. Have you been to the ER, urgent care clinic since your last visit? Hospitalized since your last visit? No    2. Have you seen or consulted any other health care providers outside of the [address] [place] [address] since your last visit? No     3. For patients aged 43-73: Has the patient had a colonoscopy / FIT/ Cologuard? NA - based on age/sex    If the patient is female:    4. For patients aged 43-66: Has the patient had a mammogram within the past 2 years? NA - based on age/sex      5. For patients aged 21-65: Has the patient had a pap smear? Yes- Care Gap Present. Rooming MA/LPN to request most recent result. VPFW 2-3 months ago.

## 2023-10-16 RX ORDER — AMLODIPINE BESYLATE 10 MG/1
TABLET ORAL
Qty: 90 TABLET | Refills: 1 | Status: SHIPPED | OUTPATIENT
Start: 2023-10-16

## 2023-10-16 RX ORDER — HYDROCHLOROTHIAZIDE 25 MG/1
TABLET ORAL
Qty: 90 TABLET | Refills: 1 | Status: SHIPPED | OUTPATIENT
Start: 2023-10-16

## 2023-11-22 ENCOUNTER — OFFICE VISIT (OUTPATIENT)
Facility: CLINIC | Age: 33
End: 2023-11-22
Payer: COMMERCIAL

## 2023-11-22 VITALS
TEMPERATURE: 97.8 F | OXYGEN SATURATION: 99 % | WEIGHT: 190 LBS | DIASTOLIC BLOOD PRESSURE: 80 MMHG | HEART RATE: 83 BPM | SYSTOLIC BLOOD PRESSURE: 118 MMHG | HEIGHT: 63 IN | BODY MASS INDEX: 33.66 KG/M2 | RESPIRATION RATE: 20 BRPM

## 2023-11-22 DIAGNOSIS — R10.2 SUPRAPUBIC PAIN, ACUTE: Primary | ICD-10-CM

## 2023-11-22 DIAGNOSIS — Z23 ENCOUNTER FOR IMMUNIZATION: ICD-10-CM

## 2023-11-22 LAB
BILIRUBIN, URINE, POC: NORMAL
BLOOD URINE, POC: NEGATIVE
GLUCOSE URINE, POC: 250
KETONES, URINE, POC: NORMAL
LEUKOCYTE ESTERASE, URINE, POC: NORMAL
NITRITE, URINE, POC: POSITIVE
PH, URINE, POC: 7 (ref 4.6–8)
PROTEIN,URINE, POC: 100
SPECIFIC GRAVITY, URINE, POC: 1.01 (ref 1–1.03)
URINALYSIS CLARITY, POC: CLEAR
URINALYSIS COLOR, POC: NORMAL
UROBILINOGEN, POC: NORMAL

## 2023-11-22 PROCEDURE — 81002 URINALYSIS NONAUTO W/O SCOPE: CPT | Performed by: FAMILY MEDICINE

## 2023-11-22 PROCEDURE — 99214 OFFICE O/P EST MOD 30 MIN: CPT | Performed by: FAMILY MEDICINE

## 2023-11-22 PROCEDURE — 3074F SYST BP LT 130 MM HG: CPT | Performed by: FAMILY MEDICINE

## 2023-11-22 PROCEDURE — 90674 CCIIV4 VAC NO PRSV 0.5 ML IM: CPT | Performed by: FAMILY MEDICINE

## 2023-11-22 PROCEDURE — 3079F DIAST BP 80-89 MM HG: CPT | Performed by: FAMILY MEDICINE

## 2023-11-22 PROCEDURE — 90471 IMMUNIZATION ADMIN: CPT | Performed by: FAMILY MEDICINE

## 2023-11-22 RX ORDER — SULFAMETHOXAZOLE AND TRIMETHOPRIM 800; 160 MG/1; MG/1
1 TABLET ORAL 2 TIMES DAILY
Qty: 6 TABLET | Refills: 0 | Status: SHIPPED | OUTPATIENT
Start: 2023-11-22 | End: 2023-11-25

## 2023-11-22 ASSESSMENT — PATIENT HEALTH QUESTIONNAIRE - PHQ9
1. LITTLE INTEREST OR PLEASURE IN DOING THINGS: 0
2. FEELING DOWN, DEPRESSED OR HOPELESS: 0
SUM OF ALL RESPONSES TO PHQ QUESTIONS 1-9: 0
SUM OF ALL RESPONSES TO PHQ9 QUESTIONS 1 & 2: 0

## 2023-11-25 LAB
C TRACH RRNA SPEC QL NAA+PROBE: NEGATIVE
N GONORRHOEA RRNA SPEC QL NAA+PROBE: NEGATIVE
T VAGINALIS RRNA SPEC QL NAA+PROBE: NEGATIVE

## 2023-11-26 ENCOUNTER — PATIENT MESSAGE (OUTPATIENT)
Facility: CLINIC | Age: 33
End: 2023-11-26

## 2023-11-26 LAB — BACTERIA UR CULT: NORMAL

## 2023-11-27 RX ORDER — NITROFURANTOIN 25; 75 MG/1; MG/1
100 CAPSULE ORAL 2 TIMES DAILY
Qty: 14 CAPSULE | Refills: 0 | Status: SHIPPED | OUTPATIENT
Start: 2023-11-27 | End: 2023-12-04

## 2024-02-18 DIAGNOSIS — F51.01 PRIMARY INSOMNIA: ICD-10-CM

## 2024-02-19 RX ORDER — ESZOPICLONE 2 MG/1
TABLET, FILM COATED ORAL
Qty: 30 TABLET | Refills: 0 | Status: SHIPPED | OUTPATIENT
Start: 2024-02-19 | End: 2024-03-20

## 2024-02-19 NOTE — TELEPHONE ENCOUNTER
Called pt and got her scheduled for a VV for a med refill per DB on 2/23/24, also scheduled her physical for 10/17/24.

## 2024-02-19 NOTE — TELEPHONE ENCOUNTER
Patient seen in the last 6 months and medication last refilled [id number].  No inconsistencies noted in

## 2024-02-26 RX ORDER — SERTRALINE HYDROCHLORIDE 100 MG/1
200 TABLET, FILM COATED ORAL DAILY
Qty: 90 TABLET | Refills: 1 | OUTPATIENT
Start: 2024-02-26

## 2024-02-26 RX ORDER — FLUTICASONE PROPIONATE 50 MCG
2 SPRAY, SUSPENSION (ML) NASAL DAILY
Qty: 3 EACH | Refills: 3 | Status: SHIPPED | OUTPATIENT
Start: 2024-02-26

## 2024-02-26 NOTE — TELEPHONE ENCOUNTER
Patient does not need Flonase needs Setraline. Had Appt with TSS last Friday but was cancelled per Office.

## 2024-02-28 ENCOUNTER — TELEPHONE (OUTPATIENT)
Facility: CLINIC | Age: 34
End: 2024-02-28

## 2024-02-28 NOTE — TELEPHONE ENCOUNTER
Patient called stating that she was waiting to hear back from the  that she would like to talk to her re: some issues that she has . Patient states she called yesterday and spoke with Manan and let here know that she was upset because she has been off of her Sertraline for 1 week and she states that she is getting the back and fourth treatment

## 2024-02-29 RX ORDER — SERTRALINE HYDROCHLORIDE 100 MG/1
200 TABLET, FILM COATED ORAL DAILY
Qty: 180 TABLET | Refills: 1 | Status: SHIPPED | OUTPATIENT
Start: 2024-02-29

## 2024-03-27 ENCOUNTER — TELEPHONE (OUTPATIENT)
Facility: CLINIC | Age: 34
End: 2024-03-27

## 2024-03-27 DIAGNOSIS — F51.01 PRIMARY INSOMNIA: Primary | ICD-10-CM

## 2024-03-27 NOTE — TELEPHONE ENCOUNTER
Patient called needing a refill for Lunesta 2 mg.    Last appt: 11.22.23 with KSL  Next appt: 10.8.24 with KRP    [facility] - DANIAL Meehan - [facility] - [phone]

## 2024-03-27 NOTE — TELEPHONE ENCOUNTER
Future Appointments  Comment3/27/2024 - 3/27/2026     Date Visit Type Department Provider    10/8/2024 11:00 AM PHYSICAL [facility] Jasmina Jessica, APRN - NP   Appointment Notes:   Return in about 5 months (around 1/11/2024) for annual wellness.                Last Refilled   Disp Refills Start End    eszopiclone (LUNESTA) 2 MG TABS 30 tablet 0 2/19/2024 3/20/2024    Sig: TAKE 1 TABLET BY MOUTH NIGHTLY. MAX DAILY AMOUNT: 2 MG    Sent to pharmacy as: Eszopiclone 2 MG Oral Tablet (LUNESTA)    Notes to Pharmacy: This request is for a new prescription for a controlled substance as required by Federal/State law. DX Code Needed  PATIENT REQUESTS NEW RX.

## 2024-03-28 RX ORDER — ESZOPICLONE 1 MG/1
1 TABLET, FILM COATED ORAL NIGHTLY
Qty: 30 TABLET | Refills: 0 | Status: SHIPPED | OUTPATIENT
Start: 2024-03-28 | End: 2024-04-27

## 2024-03-28 NOTE — TELEPHONE ENCOUNTER
Patient wanted it notated: She stated she was disappointed in how this office is running things. She explained that Lunesta has helped her greatly and that based on \"these providers\" preference they are disregarding what's helping the patients especially without seeing them and getting their history personally. Patient stated that there is a lack of empathy. Patient would like this refilled on last time and stated she will be looking elsewhere for a primary care.

## 2024-06-11 RX ORDER — AMLODIPINE BESYLATE 10 MG/1
10 TABLET ORAL DAILY
Qty: 30 TABLET | Refills: 0 | Status: SHIPPED | OUTPATIENT
Start: 2024-06-11

## 2024-06-11 NOTE — TELEPHONE ENCOUNTER
Patient Scheduled for 7/5/24 with ANA MARIA, she does take BP regularly at home    She only has a couple pills of amlodipine left and would like a refill sent into [facility] on [address]

## 2024-06-11 NOTE — TELEPHONE ENCOUNTER
----- Message from Jake Jama sent at 6/11/2024  1:49 PM EDT -----  Regarding: ECC Appointment Request  ECC Appointment Request    Patient needs appointment for ECC Appointment Type: New to Provider.    Reason for Appointment Request: No appointments available during search. Any provider will. Wants to be seen next week preferably in the morning and wants a refill for her medication which is amlodipine  --------------------------------------------------------------------------------------------------------------------------    Relationship to Patient: Self     Call Back Information: OK to leave message on voicemail  Preferred Call Back Number: Phone [phone]

## 2024-06-11 NOTE — TELEPHONE ENCOUNTER
Future Appointments  6/11/2024 - 6/11/2026     Date Visit Type Department Provider    10/8/2024 11:00 AM PHYSICAL [facility] Jasmina Jessica, BECK - NP   Appointment Notes:   Return in about 5 months (around 1/11/2024) for annual wellness.

## 2024-07-05 ENCOUNTER — TELEMEDICINE (OUTPATIENT)
Facility: CLINIC | Age: 34
End: 2024-07-05

## 2024-07-05 DIAGNOSIS — Z13.31 POSITIVE DEPRESSION SCREENING: ICD-10-CM

## 2024-07-05 DIAGNOSIS — F99 INSOMNIA DUE TO OTHER MENTAL DISORDER: ICD-10-CM

## 2024-07-05 DIAGNOSIS — R73.03 PREDIABETES: ICD-10-CM

## 2024-07-05 DIAGNOSIS — I10 PRIMARY HYPERTENSION: Primary | ICD-10-CM

## 2024-07-05 DIAGNOSIS — F41.1 GENERALIZED ANXIETY DISORDER: ICD-10-CM

## 2024-07-05 DIAGNOSIS — F33.1 MODERATE EPISODE OF RECURRENT MAJOR DEPRESSIVE DISORDER (HCC): ICD-10-CM

## 2024-07-05 DIAGNOSIS — F51.05 INSOMNIA DUE TO OTHER MENTAL DISORDER: ICD-10-CM

## 2024-07-05 PROBLEM — R09.81 SINUS CONGESTION: Status: RESOLVED | Noted: 2023-08-11 | Resolved: 2024-07-05

## 2024-07-05 PROBLEM — R31.29 MICROHEMATURIA: Status: RESOLVED | Noted: 2023-02-15 | Resolved: 2024-07-05

## 2024-07-05 PROBLEM — R35.0 URINARY FREQUENCY: Status: RESOLVED | Noted: 2023-02-15 | Resolved: 2024-07-05

## 2024-07-05 ASSESSMENT — PATIENT HEALTH QUESTIONNAIRE - PHQ9
10. IF YOU CHECKED OFF ANY PROBLEMS, HOW DIFFICULT HAVE THESE PROBLEMS MADE IT FOR YOU TO DO YOUR WORK, TAKE CARE OF THINGS AT HOME, OR GET ALONG WITH OTHER PEOPLE: VERY DIFFICULT
SUM OF ALL RESPONSES TO PHQ9 QUESTIONS 1 & 2: 3
SUM OF ALL RESPONSES TO PHQ QUESTIONS 1-9: 15
7. TROUBLE CONCENTRATING ON THINGS, SUCH AS READING THE NEWSPAPER OR WATCHING TELEVISION: SEVERAL DAYS
SUM OF ALL RESPONSES TO PHQ QUESTIONS 1-9: 15
5. POOR APPETITE OR OVEREATING: NEARLY EVERY DAY
1. LITTLE INTEREST OR PLEASURE IN DOING THINGS: MORE THAN HALF THE DAYS
6. FEELING BAD ABOUT YOURSELF - OR THAT YOU ARE A FAILURE OR HAVE LET YOURSELF OR YOUR FAMILY DOWN: SEVERAL DAYS
SUM OF ALL RESPONSES TO PHQ QUESTIONS 1-9: 15
3. TROUBLE FALLING OR STAYING ASLEEP: NEARLY EVERY DAY
SUM OF ALL RESPONSES TO PHQ QUESTIONS 1-9: 15
4. FEELING TIRED OR HAVING LITTLE ENERGY: NEARLY EVERY DAY
2. FEELING DOWN, DEPRESSED OR HOPELESS: SEVERAL DAYS
8. MOVING OR SPEAKING SO SLOWLY THAT OTHER PEOPLE COULD HAVE NOTICED. OR THE OPPOSITE, BEING SO FIGETY OR RESTLESS THAT YOU HAVE BEEN MOVING AROUND A LOT MORE THAN USUAL: SEVERAL DAYS

## 2024-07-05 NOTE — PROGRESS NOTES
Chief Complaint   Patient presents with    Follow-up     \"Have you been to the ER, urgent care clinic since your last visit?  Hospitalized since your last visit?\"    NO    “Have you seen or consulted any other health care providers outside of [facility] since your last visit?”    NO     “Have you had a pap smear?”    YES - Where: [facility] to request most recent records if not in the chart    No cervical cancer screening on file             Click Here for Release of Records Request   PHQ-9 Total Score: 15 (7/5/2024  7:10 AM)  Thoughts that you would be better off dead, or of hurting yourself in some way: -- (patient states that this week has been a bad week) (7/5/2024  7:10 AM)

## 2024-07-05 NOTE — PROGRESS NOTES
Consent: Ananda Land, who was seen by synchronous (real-time) audio-video technology, and/or her healthcare decision maker, is aware that this patient-initiated, Telehealth encounter on 7/5/2024 is a billable service, with coverage as determined by her insurance carrier. She is aware that she may receive a bill and has provided verbal consent to proceed: YES-Consent obtained within past 12 months        712  Subjective:   Ananda Land is a 33 y.o. female who was seen for Follow-up  Presents for management of hypertension, prediabetes, insomnia, and anxiety/depression.  Aware of prediabetes diagnosis. Does not limit sugar/carb intake or exercise regularly. Weight is up and attributed to insomnia and depression. She weaned off Lunesta and insomnia remains a problem.  Currently taking Benadryl nightly.  Denies snoring. Denies alcohol use. Anxiety is well controlled but depression is a concern. She follows with a therapist weekly.     Management of HTN-  Current meds: Amlodipine and hydrochlorothiazide  Home BP readings: 110s/70s  Denies lightheadedness, dizziness, headaches, chest pain, palpitations, and lower extremity edema.      Prior to Admission medications    Medication Sig Start Date End Date Taking? Authorizing Provider   diphenhydrAMINE HCl (BENADRYL PO) Take by mouth   Yes Provider, MD Danni   amLODIPine (NORVASC) 10 MG tablet Take 1 tablet by mouth daily 6/11/24  Yes Jasmina Jessica APRN - NP   sertraline (ZOLOFT) 100 MG tablet Take 2 tablets by mouth daily 2/29/24  Yes Jasmina Jessica APRN - NP   fluticasone (FLONASE) 50 MCG/ACT nasal spray SPRAY 2 SPRAYS INTO EACH NOSTRIL EVERY DAY 2/26/24  Yes Jasmina Jessica APRN - NP   hydroCHLOROthiazide (HYDRODIURIL) 25 MG tablet TAKE 1 TABLET BY MOUTH EVERY DAY 10/16/23  Yes Maged Merino APRN - NP     No Known Allergies  No problem-specific Assessment & Plan notes found for this encounter.         Objective:   Vital Signs: (As obtained

## 2024-07-06 ENCOUNTER — PATIENT MESSAGE (OUTPATIENT)
Facility: CLINIC | Age: 34
End: 2024-07-06

## 2024-07-06 DIAGNOSIS — F33.1 MODERATE EPISODE OF RECURRENT MAJOR DEPRESSIVE DISORDER (HCC): Primary | ICD-10-CM

## 2024-07-09 RX ORDER — BUPROPION HYDROCHLORIDE 150 MG/1
150 TABLET ORAL EVERY MORNING
Qty: 90 TABLET | Refills: 0 | Status: SHIPPED | OUTPATIENT
Start: 2024-07-09

## 2024-07-11 NOTE — TELEPHONE ENCOUNTER
From: Ananda Land  Sent: 7/10/2024 5:54 PM EDT  To: [facility] Clinical Staff  Subject: Medicine decision     Because I just found out I was pregnant and something doesn’t feel right. Hard to explain.

## 2024-07-26 ENCOUNTER — TELEMEDICINE (OUTPATIENT)
Facility: CLINIC | Age: 34
End: 2024-07-26

## 2024-07-26 DIAGNOSIS — F32.9 REACTIVE DEPRESSION: ICD-10-CM

## 2024-07-26 DIAGNOSIS — Z13.31 POSITIVE DEPRESSION SCREENING: ICD-10-CM

## 2024-07-26 DIAGNOSIS — F99 INSOMNIA DUE TO OTHER MENTAL DISORDER: Primary | ICD-10-CM

## 2024-07-26 DIAGNOSIS — F51.05 INSOMNIA DUE TO OTHER MENTAL DISORDER: Primary | ICD-10-CM

## 2024-07-26 DIAGNOSIS — E66.09 CLASS 1 OBESITY DUE TO EXCESS CALORIES WITH SERIOUS COMORBIDITY AND BODY MASS INDEX (BMI) OF 33.0 TO 33.9 IN ADULT: ICD-10-CM

## 2024-07-26 DIAGNOSIS — R73.03 PREDIABETES: ICD-10-CM

## 2024-07-26 RX ORDER — CLONIDINE HYDROCHLORIDE 0.1 MG/1
TABLET ORAL
Qty: 90 TABLET | Refills: 0 | Status: SHIPPED | OUTPATIENT
Start: 2024-07-26

## 2024-07-26 NOTE — PROGRESS NOTES
Chief Complaint   Patient presents with    OTHER     Having trouble sleeping     \"Have you been to the ER, urgent care clinic since your last visit?  Hospitalized since your last visit?\"    YES - When: approximately 6 days ago.  Where and Why: tricities, miscarriage.    “Have you seen or consulted any other health care providers outside of [facility] since your last visit?”    NO     “Have you had a pap smear?”    NO    No cervical cancer screening on file             Click Here for Release of Records Request   PHQ-9 Total Score: 17 (7/26/2024  1:10 PM)  Thoughts that you would be better off dead, or of hurting yourself in some way: 0 (7/26/2024  1:10 PM)         
licensed, registered, or certified to deliver care in the state where the patient is located as indicated above. If you are not or unsure, please re-schedule the visit: Yes, I confirm.        Total time spent for this encounter: Not billed by time    --BECK Mathis NP on 7/26/2024 at 2:35 PM    An electronic signature was used to authenticate this note.            BECK Mathis NP

## 2024-09-19 DIAGNOSIS — F99 INSOMNIA DUE TO OTHER MENTAL DISORDER: ICD-10-CM

## 2024-09-19 DIAGNOSIS — F51.05 INSOMNIA DUE TO OTHER MENTAL DISORDER: ICD-10-CM

## 2024-09-20 RX ORDER — CLONIDINE HYDROCHLORIDE 0.1 MG/1
TABLET ORAL
Qty: 90 TABLET | Refills: 5 | Status: SHIPPED | OUTPATIENT
Start: 2024-09-20

## 2024-10-08 ENCOUNTER — OFFICE VISIT (OUTPATIENT)
Facility: CLINIC | Age: 34
End: 2024-10-08
Payer: COMMERCIAL

## 2024-10-08 VITALS
DIASTOLIC BLOOD PRESSURE: 70 MMHG | SYSTOLIC BLOOD PRESSURE: 118 MMHG | WEIGHT: 208.5 LBS | TEMPERATURE: 97.5 F | BODY MASS INDEX: 36.94 KG/M2 | HEIGHT: 63 IN | HEART RATE: 78 BPM | RESPIRATION RATE: 16 BRPM

## 2024-10-08 DIAGNOSIS — E78.00 PURE HYPERCHOLESTEROLEMIA: ICD-10-CM

## 2024-10-08 DIAGNOSIS — N89.8 VAGINAL DISCHARGE: ICD-10-CM

## 2024-10-08 DIAGNOSIS — F33.1 MODERATE EPISODE OF RECURRENT MAJOR DEPRESSIVE DISORDER (HCC): ICD-10-CM

## 2024-10-08 DIAGNOSIS — Z13.31 POSITIVE DEPRESSION SCREENING: ICD-10-CM

## 2024-10-08 DIAGNOSIS — R35.0 URINARY FREQUENCY: ICD-10-CM

## 2024-10-08 DIAGNOSIS — E66.01 CLASS 2 SEVERE OBESITY DUE TO EXCESS CALORIES WITH SERIOUS COMORBIDITY AND BODY MASS INDEX (BMI) OF 36.0 TO 36.9 IN ADULT: ICD-10-CM

## 2024-10-08 DIAGNOSIS — R73.03 PREDIABETES: ICD-10-CM

## 2024-10-08 DIAGNOSIS — F51.01 PRIMARY INSOMNIA: ICD-10-CM

## 2024-10-08 DIAGNOSIS — F41.1 GENERALIZED ANXIETY DISORDER: ICD-10-CM

## 2024-10-08 DIAGNOSIS — Z78.9 VARICELLA VACCINATION STATUS UNKNOWN: ICD-10-CM

## 2024-10-08 DIAGNOSIS — Z11.3 SCREENING EXAMINATION FOR STI: ICD-10-CM

## 2024-10-08 DIAGNOSIS — Z23 ENCOUNTER FOR IMMUNIZATION: ICD-10-CM

## 2024-10-08 DIAGNOSIS — E66.812 CLASS 2 SEVERE OBESITY DUE TO EXCESS CALORIES WITH SERIOUS COMORBIDITY AND BODY MASS INDEX (BMI) OF 36.0 TO 36.9 IN ADULT: ICD-10-CM

## 2024-10-08 DIAGNOSIS — E53.8 VITAMIN B12 DEFICIENCY: ICD-10-CM

## 2024-10-08 DIAGNOSIS — E55.9 VITAMIN D DEFICIENCY: ICD-10-CM

## 2024-10-08 DIAGNOSIS — Z00.00 WELLNESS EXAMINATION: Primary | ICD-10-CM

## 2024-10-08 DIAGNOSIS — I10 PRIMARY HYPERTENSION: ICD-10-CM

## 2024-10-08 LAB
BILIRUBIN, URINE, POC: NEGATIVE
BLOOD URINE, POC: ABNORMAL
GLUCOSE URINE, POC: NEGATIVE
KETONES, URINE, POC: NEGATIVE
LEUKOCYTE ESTERASE, URINE, POC: ABNORMAL
NITRITE, URINE, POC: NEGATIVE
PH, URINE, POC: 6.5 (ref 4.6–8)
PROTEIN,URINE, POC: NEGATIVE
SPECIFIC GRAVITY, URINE, POC: 1.01 (ref 1–1.03)
URINALYSIS CLARITY, POC: ABNORMAL
URINALYSIS COLOR, POC: YELLOW
UROBILINOGEN, POC: ABNORMAL MG/DL

## 2024-10-08 PROCEDURE — 99395 PREV VISIT EST AGE 18-39: CPT | Performed by: NURSE PRACTITIONER

## 2024-10-08 PROCEDURE — 3078F DIAST BP <80 MM HG: CPT | Performed by: NURSE PRACTITIONER

## 2024-10-08 PROCEDURE — 90661 CCIIV3 VAC ABX FR 0.5 ML IM: CPT | Performed by: NURSE PRACTITIONER

## 2024-10-08 PROCEDURE — 99214 OFFICE O/P EST MOD 30 MIN: CPT | Performed by: NURSE PRACTITIONER

## 2024-10-08 PROCEDURE — 81002 URINALYSIS NONAUTO W/O SCOPE: CPT | Performed by: NURSE PRACTITIONER

## 2024-10-08 PROCEDURE — 90471 IMMUNIZATION ADMIN: CPT | Performed by: NURSE PRACTITIONER

## 2024-10-08 PROCEDURE — 3074F SYST BP LT 130 MM HG: CPT | Performed by: NURSE PRACTITIONER

## 2024-10-08 RX ORDER — BUPROPION HYDROCHLORIDE 300 MG/1
300 TABLET ORAL EVERY MORNING
Qty: 90 TABLET | Refills: 0 | Status: SHIPPED | OUTPATIENT
Start: 2024-10-08

## 2024-10-08 ASSESSMENT — ENCOUNTER SYMPTOMS
SHORTNESS OF BREATH: 0
TROUBLE SWALLOWING: 0
COUGH: 0
WHEEZING: 0
ABDOMINAL PAIN: 0
DIARRHEA: 0
CONSTIPATION: 0

## 2024-10-08 ASSESSMENT — PATIENT HEALTH QUESTIONNAIRE - PHQ9
10. IF YOU CHECKED OFF ANY PROBLEMS, HOW DIFFICULT HAVE THESE PROBLEMS MADE IT FOR YOU TO DO YOUR WORK, TAKE CARE OF THINGS AT HOME, OR GET ALONG WITH OTHER PEOPLE: EXTREMELY DIFFICULT
6. FEELING BAD ABOUT YOURSELF - OR THAT YOU ARE A FAILURE OR HAVE LET YOURSELF OR YOUR FAMILY DOWN: NEARLY EVERY DAY
3. TROUBLE FALLING OR STAYING ASLEEP: NEARLY EVERY DAY
9. THOUGHTS THAT YOU WOULD BE BETTER OFF DEAD, OR OF HURTING YOURSELF: SEVERAL DAYS
SUM OF ALL RESPONSES TO PHQ QUESTIONS 1-9: 24
4. FEELING TIRED OR HAVING LITTLE ENERGY: NEARLY EVERY DAY
7. TROUBLE CONCENTRATING ON THINGS, SUCH AS READING THE NEWSPAPER OR WATCHING TELEVISION: NEARLY EVERY DAY
SUM OF ALL RESPONSES TO PHQ9 QUESTIONS 1 & 2: 6
SUM OF ALL RESPONSES TO PHQ QUESTIONS 1-9: 25
2. FEELING DOWN, DEPRESSED OR HOPELESS: NEARLY EVERY DAY
1. LITTLE INTEREST OR PLEASURE IN DOING THINGS: NEARLY EVERY DAY
SUM OF ALL RESPONSES TO PHQ QUESTIONS 1-9: 25
5. POOR APPETITE OR OVEREATING: NEARLY EVERY DAY
SUM OF ALL RESPONSES TO PHQ QUESTIONS 1-9: 25
8. MOVING OR SPEAKING SO SLOWLY THAT OTHER PEOPLE COULD HAVE NOTICED. OR THE OPPOSITE, BEING SO FIGETY OR RESTLESS THAT YOU HAVE BEEN MOVING AROUND A LOT MORE THAN USUAL: NEARLY EVERY DAY

## 2024-10-08 ASSESSMENT — COLUMBIA-SUICIDE SEVERITY RATING SCALE - C-SSRS
6. HAVE YOU EVER DONE ANYTHING, STARTED TO DO ANYTHING, OR PREPARED TO DO ANYTHING TO END YOUR LIFE?: NO
1. WITHIN THE PAST MONTH, HAVE YOU WISHED YOU WERE DEAD OR WISHED YOU COULD GO TO SLEEP AND NOT WAKE UP?: NO
2. HAVE YOU ACTUALLY HAD ANY THOUGHTS OF KILLING YOURSELF?: NO

## 2024-10-08 NOTE — ASSESSMENT & PLAN NOTE
BP is below goal in the office and on home readings.  No changes.  Continue to monitor BP regularly and call if consistently greater than than 140/90 on either number.  Orders:    Comprehensive Metabolic Panel

## 2024-10-08 NOTE — ASSESSMENT & PLAN NOTE
Discussed active lifestyle approaches including good nutrition, exercise goals, and sleep hygiene for proper weight management. Eat a whole food diet of lean meats/seafood, fresh fruits and vegetables, and healthy fats.  Avoid processed, fast, and restaurant foods.  Limit sugar and refined carbohydrate intake.    Orders:    Lipid Panel    Hemoglobin A1C    Vitamin D 25 Hydroxy    Thyroid Cascade Profile

## 2024-10-08 NOTE — PROGRESS NOTES
Chief Complaint   Patient presents with    Annual Exam     \"Have you been to the ER, urgent care clinic since your last visit?  Hospitalized since your last visit?\"    NO    “Have you seen or consulted any other health care providers outside of [facility] since your last visit?”    NO     “Have you had a pap smear?”    YES - Where: [facility] Nurse/CMA to request most recent records if not in the chart    No cervical cancer screening on file             Click Here for Release of Records Request   PHQ-9 Total Score: 25 (10/8/2024 11:05 AM)  Thoughts that you would be better off dead, or of hurting yourself in some way: 1 (10/8/2024 11:05 AM)

## 2024-10-08 NOTE — PROGRESS NOTES
Subjective  Chief Complaint   Patient presents with    Annual Exam     HPI:  Ananda Land is a 33 y.o. female.  Presents for wellness and chronic disease management. Understands this is considered two appointments and there may be a copay for the chronic disease management portion of the visit.     For chronic disease management:  Medications reviewed, taking as prescribed with no known side effects.   Hypertension- reported home BP readings ~120/70 on average.   Prediabetes- dietary management. Does not exercise regularly.   Anxiety-uncontrolled.  Has been taking sertraline for at least 2 years.  Depression- Uncontrolled.  Counselors office closed suddenly. Looking for a new counselor. Trying a virtual counselor through [facility] today.   Insomnia- able to fall asleep and stay asleep with Clonidine 0.3mg nightly. She feels groggy in the morning and tired throughout the day.   Vitamin D and B12 deficiencies-does not take daily supplements.    She is also c/o urinary frequency, vaginal discharge, suprapubic pressure for the past month. Discharge is described as odorless and milky white. OTC Azo and cranberry pills are not helping. Denies fevers, chills, and body aches. History of trich 4 months ago diagnosed by gynecology.  Test of cure reported to be negative.  She is with a new partner and is requesting STI testing.       For wellness:  Immunizations:  Flu: due this fall  COVID: Recommend  Tetanus: Up-to-date    HCV screen: Complete  HIV screen: Complete  LMP: 9/9/2024  Pap: summer 2024, VPfW  Smoking status: Never    Moods: per HPI  PHQ2: positive  Diet: trying to eat healthy  Exercise: none  Vision exams: annual  Dental exams: every 6 months    Past Medical History:   Diagnosis Date    Anxiety     Depression     Hypertension      Family History   Problem Relation Age of Onset    Other Mother         pancreticitis    Hypertension Father     No Known Problems Sister     No Known Problems Brother     Unknown

## 2024-10-08 NOTE — ASSESSMENT & PLAN NOTE
Annual labs.  Continue lifestyle management.  Orders:    CBC with Auto Differential    Comprehensive Metabolic Panel    Lipid Panel    Hemoglobin A1C    Vitamin D 25 Hydroxy    Thyroid Cascade Profile

## 2024-10-08 NOTE — ASSESSMENT & PLAN NOTE
Positive depression screening today.  She expresses concerns.  Increase Wellbutrin as ordered.  Reminded it may take 4 to 6 weeks to reach therapeutic dosing.    Orders:    buPROPion (WELLBUTRIN XL) 300 MG extended release tablet; Take 1 tablet by mouth every morning

## 2024-10-08 NOTE — ASSESSMENT & PLAN NOTE
We previously discussed sleep study which she is agreeable to at this time.  Continue clonidine.  Orders:    Amb External Referral To Sleep Medicine

## 2024-10-08 NOTE — ASSESSMENT & PLAN NOTE
Uncontrolled.  Depression is a greater concern.  We discussed the relationship between depression and anxiety.  Increasing Wellbutrin as ordered.  Will discuss alternative to sertraline at follow-up in 1 month if anxiety remains a concern.  Orders:    Thyroid Cascade Profile

## 2024-10-08 NOTE — ASSESSMENT & PLAN NOTE
Updating labs.  Continue lifestyle management.  Orders:    Comprehensive Metabolic Panel    Hemoglobin A1C

## 2024-10-09 LAB
25(OH)D3+25(OH)D2 SERPL-MCNC: 32.4 NG/ML (ref 30–100)
ALBUMIN SERPL-MCNC: 4.3 G/DL (ref 3.9–4.9)
ALP SERPL-CCNC: 94 IU/L (ref 44–121)
ALT SERPL-CCNC: 14 IU/L (ref 0–32)
AST SERPL-CCNC: 19 IU/L (ref 0–40)
BASOPHILS # BLD AUTO: 0 X10E3/UL (ref 0–0.2)
BASOPHILS NFR BLD AUTO: 0 %
BILIRUB SERPL-MCNC: <0.2 MG/DL (ref 0–1.2)
BUN SERPL-MCNC: 10 MG/DL (ref 6–20)
BUN/CREAT SERPL: 11 (ref 9–23)
CALCIUM SERPL-MCNC: 9.2 MG/DL (ref 8.7–10.2)
CHLORIDE SERPL-SCNC: 101 MMOL/L (ref 96–106)
CHOLEST SERPL-MCNC: 179 MG/DL (ref 100–199)
CO2 SERPL-SCNC: 24 MMOL/L (ref 20–29)
CREAT SERPL-MCNC: 0.92 MG/DL (ref 0.57–1)
EGFRCR SERPLBLD CKD-EPI 2021: 83 ML/MIN/1.73
EOSINOPHIL # BLD AUTO: 0.1 X10E3/UL (ref 0–0.4)
EOSINOPHIL NFR BLD AUTO: 1 %
ERYTHROCYTE [DISTWIDTH] IN BLOOD BY AUTOMATED COUNT: 16.7 % (ref 11.7–15.4)
GLOBULIN SER CALC-MCNC: 3 G/DL (ref 1.5–4.5)
GLUCOSE SERPL-MCNC: 82 MG/DL (ref 70–99)
HAV IGM SERPL QL IA: NEGATIVE
HBA1C MFR BLD: 6 % (ref 4.8–5.6)
HBV CORE IGM SERPL QL IA: NEGATIVE
HBV SURFACE AG SERPL QL IA: NEGATIVE
HCT VFR BLD AUTO: 38 % (ref 34–46.6)
HCV AB SERPL QL IA: NORMAL
HCV IGG SERPL QL IA: NON REACTIVE
HDLC SERPL-MCNC: 51 MG/DL
HGB BLD-MCNC: 11.3 G/DL (ref 11.1–15.9)
HIV 1+2 AB+HIV1 P24 AG SERPL QL IA: NON REACTIVE
IMM GRANULOCYTES # BLD AUTO: 0 X10E3/UL (ref 0–0.1)
IMM GRANULOCYTES NFR BLD AUTO: 0 %
LDLC SERPL CALC-MCNC: 111 MG/DL (ref 0–99)
LYMPHOCYTES # BLD AUTO: 1.6 X10E3/UL (ref 0.7–3.1)
LYMPHOCYTES NFR BLD AUTO: 27 %
MCH RBC QN AUTO: 24 PG (ref 26.6–33)
MCHC RBC AUTO-ENTMCNC: 29.7 G/DL (ref 31.5–35.7)
MCV RBC AUTO: 81 FL (ref 79–97)
MONOCYTES # BLD AUTO: 0.5 X10E3/UL (ref 0.1–0.9)
MONOCYTES NFR BLD AUTO: 8 %
NEUTROPHILS # BLD AUTO: 3.9 X10E3/UL (ref 1.4–7)
NEUTROPHILS NFR BLD AUTO: 64 %
PLATELET # BLD AUTO: 306 X10E3/UL (ref 150–450)
POTASSIUM SERPL-SCNC: 3.8 MMOL/L (ref 3.5–5.2)
PROT SERPL-MCNC: 7.3 G/DL (ref 6–8.5)
RBC # BLD AUTO: 4.7 X10E6/UL (ref 3.77–5.28)
RPR SER QL: NON REACTIVE
SODIUM SERPL-SCNC: 138 MMOL/L (ref 134–144)
TRIGL SERPL-MCNC: 94 MG/DL (ref 0–149)
TSH SERPL DL<=0.005 MIU/L-ACNC: 0.91 UIU/ML (ref 0.45–4.5)
VIT B12 SERPL-MCNC: 506 PG/ML (ref 232–1245)
VLDLC SERPL CALC-MCNC: 17 MG/DL (ref 5–40)
VZV IGG SER QL IA: REACTIVE
WBC # BLD AUTO: 6.1 X10E3/UL (ref 3.4–10.8)

## 2024-10-10 LAB
BACTERIA UR CULT: NORMAL
C TRACH RRNA SPEC QL NAA+PROBE: NEGATIVE
N GONORRHOEA RRNA SPEC QL NAA+PROBE: NEGATIVE
T VAGINALIS RRNA SPEC QL NAA+PROBE: NEGATIVE

## 2024-10-14 LAB — METHYLMALONATE SERPL-SCNC: 67 NMOL/L (ref 0–378)

## 2024-11-05 ENCOUNTER — TELEPHONE (OUTPATIENT)
Age: 34
End: 2024-11-05

## 2024-11-05 NOTE — TELEPHONE ENCOUNTER
Patient left VM requesting appointment with Dr Gotti; Contacted patient; no answer; left VM;   Need to verify if patient is interested in medical weight loss; if so patient is required to complete orientation

## 2024-11-08 ENCOUNTER — TELEMEDICINE (OUTPATIENT)
Facility: CLINIC | Age: 34
End: 2024-11-08
Payer: COMMERCIAL

## 2024-11-08 DIAGNOSIS — F33.0 MILD EPISODE OF RECURRENT MAJOR DEPRESSIVE DISORDER (HCC): Primary | ICD-10-CM

## 2024-11-08 DIAGNOSIS — E66.01 CLASS 2 SEVERE OBESITY DUE TO EXCESS CALORIES WITH SERIOUS COMORBIDITY AND BODY MASS INDEX (BMI) OF 36.0 TO 36.9 IN ADULT: ICD-10-CM

## 2024-11-08 DIAGNOSIS — F41.1 GENERALIZED ANXIETY DISORDER: ICD-10-CM

## 2024-11-08 DIAGNOSIS — E66.812 CLASS 2 SEVERE OBESITY DUE TO EXCESS CALORIES WITH SERIOUS COMORBIDITY AND BODY MASS INDEX (BMI) OF 36.0 TO 36.9 IN ADULT: ICD-10-CM

## 2024-11-08 DIAGNOSIS — Z13.31 POSITIVE DEPRESSION SCREENING: ICD-10-CM

## 2024-11-08 PROCEDURE — 99214 OFFICE O/P EST MOD 30 MIN: CPT | Performed by: NURSE PRACTITIONER

## 2024-11-08 RX ORDER — BUPROPION HYDROCHLORIDE 300 MG/1
300 TABLET ORAL EVERY MORNING
Qty: 90 TABLET | Refills: 3 | Status: SHIPPED | OUTPATIENT
Start: 2024-11-08

## 2024-11-08 SDOH — ECONOMIC STABILITY: INCOME INSECURITY: HOW HARD IS IT FOR YOU TO PAY FOR THE VERY BASICS LIKE FOOD, HOUSING, MEDICAL CARE, AND HEATING?: VERY HARD

## 2024-11-08 SDOH — ECONOMIC STABILITY: FOOD INSECURITY: WITHIN THE PAST 12 MONTHS, YOU WORRIED THAT YOUR FOOD WOULD RUN OUT BEFORE YOU GOT MONEY TO BUY MORE.: SOMETIMES TRUE

## 2024-11-08 SDOH — ECONOMIC STABILITY: FOOD INSECURITY: WITHIN THE PAST 12 MONTHS, THE FOOD YOU BOUGHT JUST DIDN'T LAST AND YOU DIDN'T HAVE MONEY TO GET MORE.: SOMETIMES TRUE

## 2024-11-08 ASSESSMENT — PATIENT HEALTH QUESTIONNAIRE - PHQ9
1. LITTLE INTEREST OR PLEASURE IN DOING THINGS: SEVERAL DAYS
SUM OF ALL RESPONSES TO PHQ9 QUESTIONS 1 & 2: 3
4. FEELING TIRED OR HAVING LITTLE ENERGY: NEARLY EVERY DAY
6. FEELING BAD ABOUT YOURSELF - OR THAT YOU ARE A FAILURE OR HAVE LET YOURSELF OR YOUR FAMILY DOWN: SEVERAL DAYS
10. IF YOU CHECKED OFF ANY PROBLEMS, HOW DIFFICULT HAVE THESE PROBLEMS MADE IT FOR YOU TO DO YOUR WORK, TAKE CARE OF THINGS AT HOME, OR GET ALONG WITH OTHER PEOPLE: VERY DIFFICULT
7. TROUBLE CONCENTRATING ON THINGS, SUCH AS READING THE NEWSPAPER OR WATCHING TELEVISION: MORE THAN HALF THE DAYS
SUM OF ALL RESPONSES TO PHQ QUESTIONS 1-9: 15
SUM OF ALL RESPONSES TO PHQ QUESTIONS 1-9: 15
3. TROUBLE FALLING OR STAYING ASLEEP: NOT AT ALL
8. MOVING OR SPEAKING SO SLOWLY THAT OTHER PEOPLE COULD HAVE NOTICED. OR THE OPPOSITE, BEING SO FIGETY OR RESTLESS THAT YOU HAVE BEEN MOVING AROUND A LOT MORE THAN USUAL: NEARLY EVERY DAY
SUM OF ALL RESPONSES TO PHQ QUESTIONS 1-9: 15
SUM OF ALL RESPONSES TO PHQ QUESTIONS 1-9: 15
9. THOUGHTS THAT YOU WOULD BE BETTER OFF DEAD, OR OF HURTING YOURSELF: NOT AT ALL
5. POOR APPETITE OR OVEREATING: NEARLY EVERY DAY
2. FEELING DOWN, DEPRESSED OR HOPELESS: MORE THAN HALF THE DAYS

## 2024-11-08 NOTE — PROGRESS NOTES
No chief complaint on file.    \"Have you been to the ER, urgent care clinic since your last visit?  Hospitalized since your last visit?\"    NO    “Have you seen or consulted any other health care providers outside of [facility] since your last visit?”    NO     “Have you had a pap smear?”    YES - Where: [facility] Nurse/CMA to request most recent records if not in the chart    No cervical cancer screening on file             Click Here for Release of Records Request   PHQ-9 Total Score: 15 (11/8/2024  1:38 PM)  Thoughts that you would be better off dead, or of hurting yourself in some way: 0 (11/8/2024  1:38 PM)         
adult  Discussed active lifestyle approaches including good nutrition, exercise goals, and sleep hygiene for proper weight management. Eat a whole food diet of lean meats/seafood, fresh fruits and vegetables, and healthy fats.  Avoid processed, fast, and restaurant foods.  Limit sugar and refined carbohydrate intake.  Advised to call when/if ready to have labs updated before next visit.        Follow-up and Dispositions    Return in about 11 months (around 10/8/2025), or if symptoms worsen or fail to improve, for Wellness, f/u, fasting.               We discussed the expected course, resolution and complications of the diagnosis(es) in detail.  Medication risks, benefits, costs, interactions, and alternatives were discussed as indicated.  I advised her to contact the office if her condition worsens, changes or fails to improve as anticipated. She expressed understanding with the diagnosis(es) and plan.     Ananda Land, was evaluated through a synchronous (real-time) audio-video encounter. The patient (or guardian if applicable) is aware that this is a billable service, which includes applicable co-pays. This Virtual Visit was conducted with patient's (and/or legal guardian's) consent. Patient identification was verified, and a caregiver was present when appropriate.   The patient was located at Other: Place of employment in [place]  Provider was located at Home (Appt Dept State): [place]  Confirm you are appropriately licensed, registered, or certified to deliver care in the state where the patient is located as indicated above. If you are not or unsure, please re-schedule the visit: Yes, I confirm.        Total time spent for this encounter: Not billed by time    --BECK Mathis NP on 11/8/2024 at 2:01 PM    An electronic signature was used to authenticate this note.            BECK Mathis NP

## 2024-11-15 RX ORDER — SERTRALINE HYDROCHLORIDE 100 MG/1
200 TABLET, FILM COATED ORAL DAILY
Qty: 180 TABLET | Refills: 3 | Status: SHIPPED | OUTPATIENT
Start: 2024-11-15

## 2024-12-30 NOTE — TELEPHONE ENCOUNTER
Future Appointments  Pniobby0912/30/2024 - 12/30/2026   Date Visit Type Department Provider    4/4/2025  7:00 AM [facility] OFFICE VISIT [facility] Jamsina Jessica, BECK - NP   Appointment Notes:   vv

## 2024-12-31 RX ORDER — AMLODIPINE BESYLATE 10 MG/1
10 TABLET ORAL DAILY
Qty: 90 TABLET | Refills: 1 | Status: SHIPPED | OUTPATIENT
Start: 2024-12-31

## 2024-12-31 RX ORDER — HYDROCHLOROTHIAZIDE 25 MG/1
25 TABLET ORAL DAILY
Qty: 90 TABLET | Refills: 1 | Status: SHIPPED | OUTPATIENT
Start: 2024-12-31

## 2025-02-23 DIAGNOSIS — F33.0 MILD EPISODE OF RECURRENT MAJOR DEPRESSIVE DISORDER: ICD-10-CM

## 2025-02-24 RX ORDER — BUPROPION HYDROCHLORIDE 300 MG/1
300 TABLET ORAL EVERY MORNING
Qty: 90 TABLET | Refills: 1 | Status: SHIPPED | OUTPATIENT
Start: 2025-02-24

## 2025-02-25 DIAGNOSIS — F99 INSOMNIA DUE TO OTHER MENTAL DISORDER: ICD-10-CM

## 2025-02-25 DIAGNOSIS — F51.05 INSOMNIA DUE TO OTHER MENTAL DISORDER: ICD-10-CM

## 2025-02-28 RX ORDER — CLONIDINE HYDROCHLORIDE 0.1 MG/1
TABLET ORAL
Qty: 270 TABLET | Refills: 2 | OUTPATIENT
Start: 2025-02-28

## 2025-02-28 RX ORDER — CLONIDINE HYDROCHLORIDE 0.3 MG/1
0.3 TABLET ORAL NIGHTLY PRN
Qty: 90 TABLET | Refills: 3 | Status: SHIPPED | OUTPATIENT
Start: 2025-02-28

## 2025-03-04 DIAGNOSIS — F33.0 MILD EPISODE OF RECURRENT MAJOR DEPRESSIVE DISORDER: ICD-10-CM

## 2025-03-04 RX ORDER — BUPROPION HYDROCHLORIDE 300 MG/1
300 TABLET ORAL EVERY MORNING
Qty: 90 TABLET | Refills: 1 | Status: SHIPPED | OUTPATIENT
Start: 2025-03-04

## 2025-03-20 NOTE — TELEPHONE ENCOUNTER
Historically statin intolerant.  LDL was 152 in 9/23.  Follow-up LDL 11/24 is 124.  HDL 48.  Triglycerides 154.  All parameters improved from before.  Currently on Zetia.  Continue dietary modification.  .  Previously calcium score was discussed.    BMI is 40.  Calorie restriction and exercise advised.  Avoid cholesterol rich foods such as egg yolk, red meat, high fat dairy and fried food.  Will benefit from calcium score.  This has been requested.            Last seen [id number] and med last refilled [id number]

## 2025-04-04 ENCOUNTER — TELEMEDICINE (OUTPATIENT)
Facility: CLINIC | Age: 35
End: 2025-04-04
Payer: COMMERCIAL

## 2025-04-04 DIAGNOSIS — Z28.20 VACCINE REFUSED BY PATIENT: ICD-10-CM

## 2025-04-04 DIAGNOSIS — E66.01 CLASS 2 SEVERE OBESITY DUE TO EXCESS CALORIES WITH SERIOUS COMORBIDITY AND BODY MASS INDEX (BMI) OF 36.0 TO 36.9 IN ADULT: ICD-10-CM

## 2025-04-04 DIAGNOSIS — E66.812 CLASS 2 SEVERE OBESITY DUE TO EXCESS CALORIES WITH SERIOUS COMORBIDITY AND BODY MASS INDEX (BMI) OF 36.0 TO 36.9 IN ADULT: ICD-10-CM

## 2025-04-04 DIAGNOSIS — Z12.4 CERVICAL CANCER SCREENING: ICD-10-CM

## 2025-04-04 DIAGNOSIS — I10 PRIMARY HYPERTENSION: Primary | ICD-10-CM

## 2025-04-04 PROCEDURE — 99214 OFFICE O/P EST MOD 30 MIN: CPT | Performed by: NURSE PRACTITIONER

## 2025-04-04 SDOH — ECONOMIC STABILITY: FOOD INSECURITY: WITHIN THE PAST 12 MONTHS, THE FOOD YOU BOUGHT JUST DIDN'T LAST AND YOU DIDN'T HAVE MONEY TO GET MORE.: NEVER TRUE

## 2025-04-04 SDOH — ECONOMIC STABILITY: FOOD INSECURITY: WITHIN THE PAST 12 MONTHS, YOU WORRIED THAT YOUR FOOD WOULD RUN OUT BEFORE YOU GOT MONEY TO BUY MORE.: NEVER TRUE

## 2025-04-04 ASSESSMENT — PATIENT HEALTH QUESTIONNAIRE - PHQ9
SUM OF ALL RESPONSES TO PHQ QUESTIONS 1-9: 2
SUM OF ALL RESPONSES TO PHQ QUESTIONS 1-9: 2
1. LITTLE INTEREST OR PLEASURE IN DOING THINGS: SEVERAL DAYS
SUM OF ALL RESPONSES TO PHQ QUESTIONS 1-9: 2
2. FEELING DOWN, DEPRESSED OR HOPELESS: SEVERAL DAYS
SUM OF ALL RESPONSES TO PHQ QUESTIONS 1-9: 2

## 2025-04-04 NOTE — PROGRESS NOTES
Chief Complaint   Patient presents with    Follow-up     \"Have you been to the ER, urgent care clinic since your last visit?  Hospitalized since your last visit?\"    NO    “Have you seen or consulted any other health care providers outside of [facility] since your last visit?”    NO     “Have you had a pap smear?”    YES - Where: has one scheduled Nurse/CMA to request most recent records if not in the chart    No cervical cancer screening on file             Click Here for Release of Records Request   PHQ-9 Total Score: 2 (4/4/2025  7:03 AM)

## 2025-04-04 NOTE — PROGRESS NOTES
Consent: Ananda Land, who was seen by synchronous (real-time) audio-video technology, and/or her healthcare decision maker, is aware that this patient-initiated, Telehealth encounter on 4/4/2025 is a billable service, with coverage as determined by her insurance carrier. She is aware that she may receive a bill and has provided verbal consent to proceed: YES-Consent obtained within past 12 months        712  Subjective:   Ananda Land is a 34 y.o. female who was seen for Follow-up  Presents for management of hypertension and obesity.  Last pap 2024 [facility]W. Weight is down to 197.  She has been limiting portions and eating more protein and fiber.  She has increased regular exercise.    Management of HTN-  Current meds: Amlodipine and hydrochlorothiazide  Home BP readings: 110-120s/~70      Prior to Admission medications    Medication Sig Start Date End Date Taking? Authorizing Provider   Ferrous Sulfate (IRON PO) Take by mouth   Yes Danni Camacho MD   Cholecalciferol (VITAMIN D3 PO) Take by mouth   Yes Danni Camacho MD   buPROPion (WELLBUTRIN XL) 300 MG extended release tablet Take 1 tablet by mouth every morning 3/4/25  Yes Jasmina Jessica APRN - NP   cloNIDine (CATAPRES) 0.3 MG tablet Take 1 tablet by mouth nightly as needed (Insomnia) 2/28/25  Yes Jasmina Jessica APRN - NP   amLODIPine (NORVASC) 10 MG tablet TAKE 1 TABLET BY MOUTH EVERY DAY 12/31/24  Yes Jasmina Jessica APRN - NP   hydroCHLOROthiazide (HYDRODIURIL) 25 MG tablet TAKE 1 TABLET BY MOUTH EVERY DAY 12/31/24  Yes Jasmina Jessica APRN - NP   sertraline (ZOLOFT) 100 MG tablet TAKE 2 TABLETS BY MOUTH EVERY DAY 11/15/24  Yes Jasmina Jessica APRN - NP   Probiotic Product (PROBIOTIC PO) Take by mouth   Yes Danni Camacho MD   MAGNESIUM PO Take by mouth daily as needed   Yes Danni Camacho MD     No Known Allergies  No problem-specific Assessment & Plan notes found for this encounter.

## 2025-04-30 ENCOUNTER — PATIENT MESSAGE (OUTPATIENT)
Facility: CLINIC | Age: 35
End: 2025-04-30

## 2025-04-30 NOTE — TELEPHONE ENCOUNTER
Spoke with patient and scheduled her for a VV appointment on 05/02/2025 with KARON GRAY at 10am. Ok per ISAACP

## 2025-05-02 ENCOUNTER — TELEMEDICINE (OUTPATIENT)
Facility: CLINIC | Age: 35
End: 2025-05-02

## 2025-05-02 DIAGNOSIS — E66.812 CLASS 2 SEVERE OBESITY DUE TO EXCESS CALORIES WITH SERIOUS COMORBIDITY AND BODY MASS INDEX (BMI) OF 36.0 TO 36.9 IN ADULT (HCC): Primary | ICD-10-CM

## 2025-05-02 DIAGNOSIS — E78.00 PURE HYPERCHOLESTEROLEMIA: ICD-10-CM

## 2025-05-02 DIAGNOSIS — R73.03 PREDIABETES: ICD-10-CM

## 2025-05-02 DIAGNOSIS — I10 PRIMARY HYPERTENSION: ICD-10-CM

## 2025-05-02 DIAGNOSIS — E66.01 CLASS 2 SEVERE OBESITY DUE TO EXCESS CALORIES WITH SERIOUS COMORBIDITY AND BODY MASS INDEX (BMI) OF 36.0 TO 36.9 IN ADULT (HCC): Primary | ICD-10-CM

## 2025-05-02 RX ORDER — TIRZEPATIDE 2.5 MG/.5ML
2.5 INJECTION, SOLUTION SUBCUTANEOUS WEEKLY
Qty: 2 ML | Refills: 0 | Status: SHIPPED | OUTPATIENT
Start: 2025-05-02

## 2025-05-02 ASSESSMENT — PATIENT HEALTH QUESTIONNAIRE - PHQ9
1. LITTLE INTEREST OR PLEASURE IN DOING THINGS: NOT AT ALL
2. FEELING DOWN, DEPRESSED OR HOPELESS: NOT AT ALL
SUM OF ALL RESPONSES TO PHQ QUESTIONS 1-9: 0

## 2025-05-02 NOTE — ASSESSMENT & PLAN NOTE
Orders:    tirzepatide-weight management (ZEPBOUND) 2.5 MG/0.5ML SOAJ subCUTAneous auto-injector pen; Inject 2.5 mg into the skin once a week

## 2025-05-02 NOTE — PROGRESS NOTES
Chief Complaint   Patient presents with    Follow-up     Discuss  weight loss meds         Subjective   History of Present Illness  The patient presents for a consultation regarding weight loss.    The chief complaint is a desire to initiate weight loss injections due to a diagnosis of prediabetes and concerns about preventing progression to diabetes. She works with a  and is actively engaged in resistance training three times a week. Despite her efforts, she believes that elevated cortisol levels, possibly due to moderate stress, are hindering her weight loss progress. She maintains a consistent sleep schedule and does not consume alcohol. Hydration is regular, though exact intake is not tracked.     She denies a personal and known family history of thyroid cancer.  Although her mother's thyroid was removed for an unknown reason.  She reports no personal history of pancreatitis, gallbladder disease, gastroparesis, or bowel obstruction. Her current weight is 190 pounds, with a highest recorded weight of 207 pounds and a lowest weight of 125 pounds in her 20s. Previous weight loss efforts have included walking, dietary changes, and gym attendance, but no specific diets or medications have been tried.     The patient continues to see a therapist for stress management. She has requested a standing order for antibiotics to manage recurrent urinary tract infections, preferring to avoid frequent clinic visits.         Results      Prior to Admission medications    Medication Sig Start Date End Date Taking? Authorizing Provider   Multiple Vitamin (MULTIVITAMIN PO) Take by mouth   Yes Danni Camacho MD   Moringa Oleifera (MORINGA PO) Take by mouth   Yes Danni Camacho MD   NONFORMULARY Shilajit otc   Yes Danni Camacho MD   tirzepatide-weight management (ZEPBOUND) 2.5 MG/0.5ML SOAJ subCUTAneous auto-injector pen Inject 2.5 mg into the skin once a week 5/2/25  Yes Jasmina Jessica,

## 2025-05-02 NOTE — PROGRESS NOTES
Chief Complaint   Patient presents with    Follow-up     Discuss  weight loss meds     \"Have you been to the ER, urgent care clinic since your last visit?  Hospitalized since your last visit?\"    NO    “Have you seen or consulted any other health care providers outside of [facility] since your last visit?”    NO     “Have you had a pap smear?”    NO    No cervical cancer screening on file             Click Here for Release of Records Request   PHQ-9 Total Score: 0 (5/2/2025  9:46 AM)

## 2025-05-08 ENCOUNTER — OFFICE VISIT (OUTPATIENT)
Facility: CLINIC | Age: 35
End: 2025-05-08

## 2025-05-08 ENCOUNTER — PATIENT MESSAGE (OUTPATIENT)
Facility: CLINIC | Age: 35
End: 2025-05-08

## 2025-05-08 VITALS
BODY MASS INDEX: 37.39 KG/M2 | DIASTOLIC BLOOD PRESSURE: 82 MMHG | WEIGHT: 211 LBS | HEIGHT: 63 IN | RESPIRATION RATE: 18 BRPM | OXYGEN SATURATION: 97 % | SYSTOLIC BLOOD PRESSURE: 120 MMHG | HEART RATE: 82 BPM | TEMPERATURE: 97.2 F

## 2025-05-08 DIAGNOSIS — J06.9 VIRAL URI: Primary | ICD-10-CM

## 2025-05-08 RX ORDER — BENZONATATE 100 MG/1
100-200 CAPSULE ORAL 3 TIMES DAILY PRN
Qty: 30 CAPSULE | Refills: 0 | Status: SHIPPED | OUTPATIENT
Start: 2025-05-08 | End: 2025-05-15

## 2025-05-08 RX ORDER — OXYMETAZOLINE HYDROCHLORIDE 0.05 G/100ML
2 SPRAY NASAL 2 TIMES DAILY
Qty: 15 ML | Refills: 0 | Status: SHIPPED | OUTPATIENT
Start: 2025-05-08 | End: 2025-05-15

## 2025-05-08 ASSESSMENT — ENCOUNTER SYMPTOMS
SHORTNESS OF BREATH: 0
EYE ITCHING: 0
WHEEZING: 0
SINUS PRESSURE: 1
VOICE CHANGE: 1
CHEST TIGHTNESS: 0
RHINORRHEA: 1
TROUBLE SWALLOWING: 0
FACIAL SWELLING: 0
STRIDOR: 1
EYE PAIN: 0
EYE DISCHARGE: 0
ABDOMINAL PAIN: 0
SORE THROAT: 1
DIARRHEA: 0
SINUS PAIN: 0

## 2025-05-08 NOTE — TELEPHONE ENCOUNTER
Spoke with patient and scheduled her for an acute appointment with NP CRYSTAL today 05/08/2025 at 4pm.

## 2025-05-08 NOTE — ASSESSMENT & PLAN NOTE
-Advised patient she is negative for Covid and strep.   -Provided patient with list of OTC medications to take for symptom management  -Discussed saline nasal rinses   -Discussed increasing daily fluid intake  -Advised patient to continue wearing a mask around other people while symptomatic and encouraged good hand hygiene   -She will be starting zyrtec and benadryl that she bought OTC and will continue flonase.  -Advised patient to contact the office if no improvement in symptoms by day ten of symptoms  -Discussed benefits, risks, and side effects of prescribed medication.  -Work note provided clearing her to return to work if she desires.

## 2025-05-08 NOTE — PROGRESS NOTES
The patient, Ananda Land, identity was verified by name and MRN.  Chief Complaint   Patient presents with    Sinus Pressure     In nose - not sleeping stuffy nose. Tried Theraflu Robitussin and allergy meds. Started Monday     No LMP recorded.  /82   Pulse 82   Temp 97.2 °F (36.2 °C) (Temporal)   Resp 18   Ht 1.6 m (5' 3\")   Wt 95.7 kg (211 lb)   SpO2 97%   BMI 37.38 kg/m²        No data to display              No data recorded  \"Have you been to the ER, urgent care clinic since your last visit?  Hospitalized since your last visit?\"    NO    “Have you seen or consulted any other health care providers outside our system since your last visit?”    NO     “Have you had a pap smear?”    NO    No cervical cancer screening on file              Social History     Substance and Sexual Activity   Sexual Activity Yes    Partners: Male    Birth control/protection: None     Medication list reviewed and active medications noted. Patient is taking medications as directed.  See documentation in medication activity.  Allergies: allergy list reviewed, no new allergies added        No questionnaires available.                           Catarina Quevedo LPN      
Mental Status: She is alert. Mental status is at baseline.           Assessment & Plan    1. Viral URI  Assessment & Plan:  -Advised patient she is negative for Covid and strep.   -Provided patient with list of OTC medications to take for symptom management  -Discussed saline nasal rinses   -Discussed increasing daily fluid intake  -Advised patient to continue wearing a mask around other people while symptomatic and encouraged good hand hygiene   -She will be starting zyrtec and benadryl that she bought OTC and will continue flonase.  -Advised patient to contact the office if no improvement in symptoms by day ten of symptoms  -Discussed benefits, risks, and side effects of prescribed medication.  -Work note provided clearing her to return to work if she desires.  Orders:  -     oxymetazoline (12 HOUR NASAL SPRAY) 0.05 % nasal spray; 2 sprays by Nasal route 2 times daily for 7 days, Disp-15 mL, R-0Normal  -     benzonatate (TESSALON) 100 MG capsule; Take 1-2 capsules by mouth 3 times daily as needed for Cough, Disp-30 capsule, R-0Normal        Aspects of this note may have been generated using voice recognition software. Despite editing, there may be some syntax errors.    Return if symptoms worsen or fail to improve.      I have discussed the diagnosis with the patient and the intended plan as seen in the above orders. The patient has received an after-visit summary and questions were answered concerning future plans.  I have discussed medication side effects and warnings with the patient as well.    Loela Syed, BECK - CNP

## 2025-05-09 ENCOUNTER — TELEPHONE (OUTPATIENT)
Facility: CLINIC | Age: 35
End: 2025-05-09

## 2025-05-09 NOTE — TELEPHONE ENCOUNTER
Patient came in for Acute visit yesterday and asked about PA for Zebound. I did a PA and it came back denied and stated that we need to submit medical records or patient trying/having    1) You have tried and failed one of the non-Glucagon-Like Peptide-1 (GLP-1) weight loss medications (for example: Orlistat and phentermine (requires prior authorization)) for 6 months and did not achieve a 10 pound weight loss. Body mass index of higher than 30 or 27 with 1 or more risk factor (dyslipidemia, hypertension, type II diabetes). 2) You must try Saxenda first (prior authorization require with a body mass index (BMI) over 40, or over 37 with 1 or more risk factors (dyslipidemia, hypertension, type II diabetes). 3) Your current height and body mass index with your body mass index over 40, or over 37 with 1 or more risk factor (dyslipidemia, hypertension, type II diabetes).

## 2025-05-20 RX ORDER — AMLODIPINE BESYLATE 10 MG/1
10 TABLET ORAL DAILY
Qty: 90 TABLET | Refills: 1 | Status: SHIPPED | OUTPATIENT
Start: 2025-05-20

## 2025-05-20 RX ORDER — HYDROCHLOROTHIAZIDE 25 MG/1
25 TABLET ORAL DAILY
Qty: 90 TABLET | Refills: 1 | Status: SHIPPED | OUTPATIENT
Start: 2025-05-20

## 2025-05-30 ENCOUNTER — OFFICE VISIT (OUTPATIENT)
Facility: CLINIC | Age: 35
End: 2025-05-30
Payer: COMMERCIAL

## 2025-05-30 VITALS
RESPIRATION RATE: 18 BRPM | WEIGHT: 212 LBS | DIASTOLIC BLOOD PRESSURE: 70 MMHG | HEART RATE: 78 BPM | BODY MASS INDEX: 37.56 KG/M2 | SYSTOLIC BLOOD PRESSURE: 110 MMHG | HEIGHT: 63 IN | OXYGEN SATURATION: 100 % | TEMPERATURE: 97.2 F

## 2025-05-30 DIAGNOSIS — E66.01 CLASS 2 SEVERE OBESITY DUE TO EXCESS CALORIES WITH SERIOUS COMORBIDITY AND BODY MASS INDEX (BMI) OF 36.0 TO 36.9 IN ADULT (HCC): Primary | ICD-10-CM

## 2025-05-30 DIAGNOSIS — E66.812 CLASS 2 SEVERE OBESITY DUE TO EXCESS CALORIES WITH SERIOUS COMORBIDITY AND BODY MASS INDEX (BMI) OF 36.0 TO 36.9 IN ADULT (HCC): Primary | ICD-10-CM

## 2025-05-30 PROBLEM — J06.9 VIRAL URI: Status: RESOLVED | Noted: 2025-05-08 | Resolved: 2025-05-30

## 2025-05-30 PROCEDURE — 99213 OFFICE O/P EST LOW 20 MIN: CPT

## 2025-05-30 PROCEDURE — 3074F SYST BP LT 130 MM HG: CPT

## 2025-05-30 PROCEDURE — 3078F DIAST BP <80 MM HG: CPT

## 2025-05-30 ASSESSMENT — ENCOUNTER SYMPTOMS: SHORTNESS OF BREATH: 0

## 2025-05-30 NOTE — PROGRESS NOTES
Acute Office Visit    Subjective  Chief Complaint   Patient presents with    Weight Management     Zepbound was denied they want proof of trying phentermine orlistat and saxenda     HPI:  Ananda Land is a 34 y.o. female.  She presents today for weight loss counseling.    1. Class 2 severe obesity due to excess calories with serious comorbidity and body mass index (BMI) of 36.0 to 36.9 in adult (HCC)  Today's weight/BMI: 212pounds, BMI 37.55, morbid obesity  Goal Weight/Trend: Would like to be 150, was that last in 2015 after birth of son  Weight Loss Interventions/History: No history of weight loss surgeries. She has done calorie tracking programs such as weight watchers in the past. Patient has done phentermine in the past and it was not effective for weight loss and gave her headaches and increased heart rate. She was doing [employer] at that time. She has also done [employer] classes.   Exercise: She goes to the gym 30 minutes Monday, Wednesday, and Friday on the stair master. She will often lift weights after the stair master.   Diet: Patient ate chicken breast stuffed with peppers, sweet potatoes and rice. She eats fish, vegetables and rice primarily. She doesn't drink any sodas. Patient will often have eggs or oatmeal for breakfast, will eat turkey sausage on the weekends. For snacks she will have apples with peanut butter and minimal granola.   Water: She is drinking 80 ounces of water daily, no alcohol use.  Sleep: Patient denies that she snores at night. She sleeps maybe seven and a half hours nightly.   Associated Diagnostics: HGBA1C elevated to 6.0 in October 2024  Co-morbidities supporting the benefit of weight loss medication: Menses are regular, not heavy or painful. Hypertension, prediabetes.  Risk Factors Associated with Treatment Options: Patient has had negative side effects in the past with phentermine making that not a good option.  She is already on Wellbutrin at the max dose.    Past

## 2025-05-30 NOTE — PROGRESS NOTES
The patient, Ananda Land, identity was verified by name and MRN.  Chief Complaint   Patient presents with    Weight Management     No LMP recorded.  Wt 96.2 kg (212 lb)   BMI 37.55 kg/m²        No data to display              No data recorded  \"Have you been to the ER, urgent care clinic since your last visit?  Hospitalized since your last visit?\"    NO    “Have you seen or consulted any other health care providers outside our system since your last visit?”    NO     “Have you had a pap smear?”    NO    No cervical cancer screening on file              Social History     Substance and Sexual Activity   Sexual Activity Yes    Partners: Male    Birth control/protection: None     Medication list reviewed and active medications noted. Patient is taking medications as directed.  See documentation in medication activity.  Allergies: allergy list reviewed, no new allergies added        No questionnaires available.                           Catarina Quevedo LPN

## 2025-05-30 NOTE — ASSESSMENT & PLAN NOTE
-Discussed daily water intake of at least eight 8 ounce cups daily  -Discussed exercising at least thirty minutes daily, even if broken up into five minute increments in the day  -Discussed stress reduction and getting adequate sleep at night  -Discussed diet high in lean proteins and vegetables  -Discussed benefit of tracking calories and maintaining a calorie deficit of 1200 calories daily  -Discussed limiting sugary processed foods and take out  -Discussed at length risks, benefits, and side effects of GLP1 agonists.   -Discussed with patient adverse effects including pancreatitis, inflammation to the gallbladder, or gastroparesis.   -Discussed possible muscle loss related to GLP1s and encouraged muscle building activities.   -Discussed high occurrence of gaining weight back when off of medication without serious lifestyle modifications.

## 2025-06-11 DIAGNOSIS — E66.812 CLASS 2 SEVERE OBESITY DUE TO EXCESS CALORIES WITH SERIOUS COMORBIDITY AND BODY MASS INDEX (BMI) OF 36.0 TO 36.9 IN ADULT (HCC): Primary | ICD-10-CM

## 2025-06-11 DIAGNOSIS — E66.01 CLASS 2 SEVERE OBESITY DUE TO EXCESS CALORIES WITH SERIOUS COMORBIDITY AND BODY MASS INDEX (BMI) OF 36.0 TO 36.9 IN ADULT (HCC): Primary | ICD-10-CM

## 2025-06-11 DIAGNOSIS — R11.0 NAUSEA: Primary | ICD-10-CM

## 2025-06-11 RX ORDER — ONDANSETRON 4 MG/1
4 TABLET, ORALLY DISINTEGRATING ORAL 3 TIMES DAILY PRN
Qty: 21 TABLET | Refills: 0 | Status: SHIPPED | OUTPATIENT
Start: 2025-06-11

## 2025-08-07 ENCOUNTER — OFFICE VISIT (OUTPATIENT)
Facility: CLINIC | Age: 35
End: 2025-08-07
Payer: COMMERCIAL

## 2025-08-07 VITALS
HEIGHT: 63 IN | RESPIRATION RATE: 16 BRPM | HEART RATE: 68 BPM | BODY MASS INDEX: 37.03 KG/M2 | TEMPERATURE: 97.1 F | DIASTOLIC BLOOD PRESSURE: 68 MMHG | SYSTOLIC BLOOD PRESSURE: 100 MMHG | WEIGHT: 209 LBS | OXYGEN SATURATION: 98 %

## 2025-08-07 DIAGNOSIS — N39.0 FREQUENT UTI: ICD-10-CM

## 2025-08-07 DIAGNOSIS — R30.0 DYSURIA: Primary | ICD-10-CM

## 2025-08-07 LAB
BILIRUBIN, URINE, POC: NEGATIVE
BLOOD URINE, POC: NEGATIVE
GLUCOSE URINE, POC: NEGATIVE
KETONES, URINE, POC: NEGATIVE
LEUKOCYTE ESTERASE, URINE, POC: NORMAL
NITRITE, URINE, POC: NEGATIVE
PH, URINE, POC: 6 (ref 4.6–8)
PROTEIN,URINE, POC: 30
SPECIFIC GRAVITY, URINE, POC: 1.03 (ref 1–1.03)
URINALYSIS CLARITY, POC: NORMAL
URINALYSIS COLOR, POC: NORMAL
UROBILINOGEN, POC: NORMAL

## 2025-08-07 PROCEDURE — 81003 URINALYSIS AUTO W/O SCOPE: CPT

## 2025-08-07 PROCEDURE — 99214 OFFICE O/P EST MOD 30 MIN: CPT

## 2025-08-07 PROCEDURE — 3078F DIAST BP <80 MM HG: CPT

## 2025-08-07 PROCEDURE — 3074F SYST BP LT 130 MM HG: CPT

## 2025-08-07 RX ORDER — PHENAZOPYRIDINE HYDROCHLORIDE 100 MG/1
100 TABLET, FILM COATED ORAL 3 TIMES DAILY PRN
Qty: 9 TABLET | Refills: 0 | Status: SHIPPED | OUTPATIENT
Start: 2025-08-07 | End: 2026-08-07

## 2025-08-07 RX ORDER — SULFAMETHOXAZOLE AND TRIMETHOPRIM 800; 160 MG/1; MG/1
1 TABLET ORAL 2 TIMES DAILY
Qty: 6 TABLET | Refills: 0 | Status: SHIPPED | OUTPATIENT
Start: 2025-08-07 | End: 2025-08-10

## 2025-08-09 LAB — BACTERIA UR CULT: NORMAL

## 2025-08-10 DIAGNOSIS — N76.0 BACTERIAL VAGINOSIS: Primary | ICD-10-CM

## 2025-08-10 DIAGNOSIS — B96.89 BACTERIAL VAGINOSIS: Primary | ICD-10-CM

## 2025-08-10 LAB
A VAGINAE DNA VAG QL NAA+PROBE: ABNORMAL SCORE
BVAB2 DNA VAG QL NAA+PROBE: ABNORMAL SCORE
C ALBICANS DNA VAG QL NAA+PROBE: NEGATIVE
C GLABRATA DNA VAG QL NAA+PROBE: NEGATIVE
C KRUSEI DNA VAG QL NAA+PROBE: NEGATIVE
C LUSITANIAE DNA VAG QL NAA+PROBE: NEGATIVE
C TRACH DNA SPEC QL NAA+PROBE: NEGATIVE
CANDIDA DNA VAG QL NAA+PROBE: NEGATIVE
MEGA1 DNA VAG QL NAA+PROBE: ABNORMAL SCORE
N GONORRHOEA DNA VAG QL NAA+PROBE: NEGATIVE
T VAGINALIS DNA VAG QL NAA+PROBE: NEGATIVE

## 2025-08-10 RX ORDER — METRONIDAZOLE 500 MG/1
500 TABLET ORAL 2 TIMES DAILY
Qty: 14 TABLET | Refills: 0 | Status: SHIPPED | OUTPATIENT
Start: 2025-08-10 | End: 2025-08-17